# Patient Record
Sex: FEMALE | Race: WHITE | NOT HISPANIC OR LATINO | Employment: FULL TIME | URBAN - METROPOLITAN AREA
[De-identification: names, ages, dates, MRNs, and addresses within clinical notes are randomized per-mention and may not be internally consistent; named-entity substitution may affect disease eponyms.]

---

## 2017-11-30 ENCOUNTER — TELEPHONE (OUTPATIENT)
Dept: ORTHOPEDICS | Facility: CLINIC | Age: 47
End: 2017-11-30

## 2017-11-30 DIAGNOSIS — M79.641 RIGHT HAND PAIN: Primary | ICD-10-CM

## 2017-11-30 NOTE — TELEPHONE ENCOUNTER
Ortho Telephone Triage Call  [phone]  Notified pt that Ortho appt has been scheduled on 12/18/17 with ROCHELLE Bradford PA-C/[facility]  for c/o R thumb trigger finger. Xrays to be scheduled at 10:30am - 1st floor Radiology - followed by appt with ROCHELLE Mascorro PA-C at 11:30am. Pt states understanding. [facility] contact number provided for any questions/concerns in interim. Appt slip mailed.

## 2017-12-15 ENCOUNTER — TELEPHONE (OUTPATIENT)
Dept: ORTHOPEDICS | Facility: CLINIC | Age: 47
End: 2017-12-15

## 2017-12-18 ENCOUNTER — HOSPITAL ENCOUNTER (OUTPATIENT)
Dept: RADIOLOGY | Facility: OTHER | Age: 47
Discharge: HOME | End: 2017-12-18
Attending: ORTHOPAEDIC SURGERY
Payer: COMMERCIAL

## 2017-12-18 ENCOUNTER — OFFICE VISIT (OUTPATIENT)
Dept: ORTHOPEDICS | Facility: CLINIC | Age: 47
End: 2017-12-18
Payer: COMMERCIAL

## 2017-12-18 VITALS
WEIGHT: 150 LBS | HEIGHT: 66 IN | BODY MASS INDEX: 24.11 KG/M2 | RESPIRATION RATE: 18 BRPM | HEART RATE: 71 BPM | DIASTOLIC BLOOD PRESSURE: 82 MMHG | SYSTOLIC BLOOD PRESSURE: 101 MMHG

## 2017-12-18 DIAGNOSIS — M65.311 TRIGGER FINGER OF RIGHT THUMB: Primary | ICD-10-CM

## 2017-12-18 DIAGNOSIS — M79.641 RIGHT HAND PAIN: ICD-10-CM

## 2017-12-18 PROCEDURE — 73130 X-RAY EXAM OF HAND: CPT | Mod: 26,RT,, | Performed by: RADIOLOGY

## 2017-12-18 PROCEDURE — 99999 PR PBB SHADOW E&M-EST. PATIENT-LVL III: CPT | Mod: PBBFAC,,, | Performed by: PHYSICIAN ASSISTANT

## 2017-12-18 PROCEDURE — 73130 X-RAY EXAM OF HAND: CPT | Mod: TC,RT

## 2017-12-18 PROCEDURE — 99204 OFFICE O/P NEW MOD 45 MIN: CPT | Mod: S$GLB,,, | Performed by: PHYSICIAN ASSISTANT

## 2017-12-18 RX ORDER — CETIRIZINE HYDROCHLORIDE 10 MG/1
10 TABLET ORAL DAILY
COMMUNITY

## 2017-12-18 RX ORDER — ASPIRIN 81 MG/1
81 TABLET ORAL NIGHTLY
COMMUNITY
End: 2021-02-08 | Stop reason: CLARIF

## 2017-12-18 RX ORDER — HYDROCHLOROTHIAZIDE 12.5 MG/1
12.5 TABLET ORAL DAILY
COMMUNITY
End: 2021-02-08 | Stop reason: CLARIF

## 2017-12-18 RX ORDER — MAGNESIUM 30 MG
TABLET ORAL NIGHTLY
COMMUNITY

## 2017-12-18 RX ORDER — AMLODIPINE BESYLATE 5 MG/1
5 TABLET ORAL NIGHTLY
COMMUNITY

## 2017-12-18 RX ORDER — MULTIVIT WITH MINERALS/HERBS
1 TABLET ORAL DAILY
COMMUNITY

## 2017-12-18 RX ORDER — POTASSIUM CHLORIDE 750 MG/1
10 TABLET, EXTENDED RELEASE ORAL ONCE
COMMUNITY
End: 2021-02-08 | Stop reason: CLARIF

## 2017-12-18 RX ORDER — IBUPROFEN 100 MG/5ML
1000 SUSPENSION, ORAL (FINAL DOSE FORM) ORAL DAILY
COMMUNITY

## 2017-12-18 RX ORDER — CHOLECALCIFEROL (VITAMIN D3) 25 MCG
1000 TABLET ORAL NIGHTLY
COMMUNITY

## 2017-12-18 NOTE — PROGRESS NOTES
"Subjective:      Patient ID: Nadine To is a 47 y.o. female.    Chief Complaint: Pain of the Right Hand      HPI  Nadine To is a right hand dominant 47 y.o. female presenting today for evaluation of new pain in the right hand.  There was not a history of trauma.  Onset of symptoms began  in July 2017, but got worse in 8/2017.  She had a shot for trigger finger on the [place] at an orthopedic office in 8/2017, but says that she had a bad experience and mild improvement in pain but not totally resolved.  She reports that it now pops if she moves it "more than a certain amount" of flexion.  She reports that it has to be actively released. She describes a throbbing pain 9/10  That does wake her up at night, occasionally occurs at work.  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     She admits to a history of LBBB and says that her last EKG was in early 2017.      Review of patient's allergies indicates:  No Known Allergies      Current Outpatient Prescriptions   Medication Sig Dispense Refill    amLODIPine (NORVASC) 5 MG tablet Take 5 mg by mouth once daily.      ascorbic acid, vitamin C, (VITAMIN C) 1000 MG tablet Take 1,000 mg by mouth once daily.      aspirin (ECOTRIN) 81 MG EC tablet Take 81 mg by mouth once daily.      b complex vitamins tablet Take 1 tablet by mouth once daily.      cetirizine (ZYRTEC) 10 MG tablet Take 10 mg by mouth once daily.      hydroCHLOROthiazide (HYDRODIURIL) 12.5 MG Tab Take 12.5 mg by mouth once daily.      Lactobacillus rhamnosus GG (CULTURELLE) 10 billion cell capsule Take 1 capsule by mouth once daily.      magnesium 30 mg Tab Take by mouth once.      potassium chloride (KLOR-CON) 10 MEQ TbSR Take 10 mEq by mouth once.      vitamin D 1000 units Tab Take 1,000 Units by mouth once daily.       No current facility-administered medications for this visit.        History reviewed. No pertinent past medical " "history.    Past Surgical History:   Procedure Laterality Date    BREAST SURGERY Bilateral          Review of Systems:  Review of Systems   Constitution: Negative for chills and fever.   Skin: Negative for rash and suspicious lesions.   Musculoskeletal:        See HPI   Neurological: Negative for dizziness, headaches, light-headedness, numbness and paresthesias.   Psychiatric/Behavioral: Negative for depression. The patient is not nervous/anxious.          OBJECTIVE:     PHYSICAL EXAM:  Height: 5' 5.5" (166.4 cm) Weight: 68 kg (150 lb)     Vitals:    12/18/17 1112   BP: 101/82   Pulse: 71   Resp: 18     General    Vitals reviewed.  Constitutional: She is oriented to person, place, and time. She appears well-developed and well-nourished.   HENT:   Head: Normocephalic and atraumatic.   Neck: Normal range of motion.   Cardiovascular: Normal rate.    Pulmonary/Chest: Effort normal. No respiratory distress.   Neurological: She is alert and oriented to person, place, and time.   Psychiatric: She has a normal mood and affect. Her behavior is normal. Judgment and thought content normal.           Musculoskeletal: No scars or edema appreciated.  Palpable nodularity at the A1 pulley of both thumbs and the right ring finger. Tender to palpation at the A1 pulley of the right thumb.  Good finger ROM noted. NVI- good sensation and motor function, good capillary refill.      RADIOGRAPHS:  Right Hand X-Ray, 12/18/17  FINDINGS:   Fracture: None.   Joint Spaces: Normal.   Soft Tissues: Normal.   Other: N/A   Impression   No significant abnormality.     Comments: I have personally reviewed the imaging and I agree with the above radiologist's report.    ASSESSMENT/PLAN:   Nadine was seen today for pain.    Diagnoses and all orders for this visit:    Trigger finger of right thumb  -     Vital signs; Standing  -     Diet NPO; Standing  -     Chlorohexidine Gluconate Bath; Standing  -     Place in Outpatient; Standing  -     Patient " may leave on underwear for knee, ankle, and upper extremity surgery; Standing  -     Insert peripheral IV; Standing  -     Place ELIS hose; Standing    Other orders  -     mupirocin 2 % ointment; by Nasal route On call Procedure (surgery).  -     Medium Risk of VTE; Standing  -     ceFAZolin (ANCEF) 2 g in dextrose 5 % 50 mL IVPB; Inject 2 g into the vein On call Procedure (Surgery).           - We talked at length about the anatomy and pathophysiology of   Encounter Diagnosis   Name Primary?    Trigger finger of right thumb Yes       - Discussed treatment options for trigger finger, including injection, splint, and surgery  - Discussed indications and risks of trigger finger release.  Her questions were answered. Consent forms signed today in clinic.  - Call with questions or concerns

## 2017-12-21 RX ORDER — MUPIROCIN 20 MG/G
OINTMENT TOPICAL
Status: CANCELLED | OUTPATIENT
Start: 2017-12-21

## 2017-12-29 ENCOUNTER — HOSPITAL ENCOUNTER (OUTPATIENT)
Dept: PREADMISSION TESTING | Facility: OTHER | Age: 47
Discharge: HOME | End: 2017-12-29
Attending: ORTHOPAEDIC SURGERY
Payer: COMMERCIAL

## 2017-12-29 ENCOUNTER — ANESTHESIA EVENT (OUTPATIENT)
Dept: SURGERY | Facility: OTHER | Age: 47
End: 2017-12-29
Payer: COMMERCIAL

## 2017-12-29 VITALS
HEART RATE: 71 BPM | WEIGHT: 150 LBS | TEMPERATURE: 99 F | SYSTOLIC BLOOD PRESSURE: 114 MMHG | BODY MASS INDEX: 24.99 KG/M2 | DIASTOLIC BLOOD PRESSURE: 69 MMHG | OXYGEN SATURATION: 99 % | HEIGHT: 65 IN

## 2017-12-29 RX ORDER — FAMOTIDINE 20 MG/1
20 TABLET, FILM COATED ORAL
Status: CANCELLED | OUTPATIENT
Start: 2017-12-29 | End: 2017-12-29

## 2017-12-29 RX ORDER — LIDOCAINE HYDROCHLORIDE 10 MG/ML
0.5 INJECTION, SOLUTION EPIDURAL; INFILTRATION; INTRACAUDAL; PERINEURAL ONCE
Status: CANCELLED | OUTPATIENT
Start: 2017-12-29 | End: 2017-12-29

## 2017-12-29 RX ORDER — OXYCODONE HYDROCHLORIDE 5 MG/1
5 TABLET ORAL ONCE AS NEEDED
Status: CANCELLED | OUTPATIENT
Start: 2017-12-29 | End: 2017-12-29

## 2017-12-29 RX ORDER — SODIUM CHLORIDE, SODIUM LACTATE, POTASSIUM CHLORIDE, CALCIUM CHLORIDE 600; 310; 30; 20 MG/100ML; MG/100ML; MG/100ML; MG/100ML
INJECTION, SOLUTION INTRAVENOUS CONTINUOUS
Status: CANCELLED | OUTPATIENT
Start: 2017-12-29

## 2017-12-29 RX ORDER — MIDAZOLAM HYDROCHLORIDE 5 MG/ML
4 INJECTION INTRAMUSCULAR; INTRAVENOUS
Status: CANCELLED | OUTPATIENT
Start: 2017-12-29

## 2017-12-29 NOTE — ANESTHESIA PREPROCEDURE EVALUATION
12/29/2017  Nadine Frances is a 47 y.o., female.    Anesthesia Evaluation    I have reviewed the Patient Summary Reports.    I have reviewed the Nursing Notes.   I have reviewed the Medications.     Review of Systems  Anesthesia Hx:  No problems with previous Anesthesia    Social:  Social Alcohol Use, Non-Smoker    Hematology/Oncology:  Hematology Normal   Oncology Normal     EENT/Dental:EENT/Dental Normal   Cardiovascular:   Exercise tolerance: good Hypertension, well controlled Dysrhythmias    Pulmonary:  Pulmonary Normal    Renal/:  Renal/ Normal     Hepatic/GI:  Hepatic/GI Normal    Musculoskeletal:  Musculoskeletal Normal    Neurological:  Neurology Normal    Endocrine:  Endocrine Normal    Dermatological:  Skin Normal    Psych:  Psychiatric Normal           Physical Exam  General:  Well nourished    Airway/Jaw/Neck:  Airway Findings: Tongue: Normal General Airway Assessment: Good  Mallampati: I  TM Distance: Normal, at least 6 cm  Jaw/Neck Findings:  Neck ROM: Normal ROM      Dental:  Dental Findings: In tact         Mental Status:  Mental Status Findings:  Cooperative, Alert and Oriented         Anesthesia Plan  Type of Anesthesia, risks & benefits discussed:  Anesthesia Type:  general  Patient's Preference:   Intra-op Monitoring Plan: standard ASA monitors  Intra-op Monitoring Plan Comments:   Post Op Pain Control Plan:   Post Op Pain Control Plan Comments:   Induction:    Beta Blocker:         Informed Consent: Patient understands risks and agrees with Anesthesia plan.  Questions answered. Anesthesia consent signed with patient.  ASA Score: 2     Day of Surgery Review of History & Physical:    H&P update referred to the surgeon.     Anesthesia Plan Notes: No labs.        Ready For Surgery From Anesthesia Perspective.

## 2017-12-29 NOTE — DISCHARGE INSTRUCTIONS
PRE-ADMIT TESTING -  [phone]    [address]  [place]          Your surgery has been scheduled at [facility]. We are pleased to have the opportunity to serve you. For Further Information please call [phone].    On the day of surgery please report to the Information Desk on the 1st floor.    · CONTACT YOUR PHYSICIAN'S OFFICE THE DAY PRIOR TO YOUR SURGERY TO OBTAIN YOUR ARRIVAL TIME.     · The evening before surgery do not eat anything after 9 p.m. ( this includes hard candy, chewing gum and mints).  You may only have GATORADE, POWERADE AND WATER  from 9 p.m. until you leave your home.   DO NOT DRINK ANY LIQUIDS ON THE WAY TO THE HOSPITAL.      SPECIAL MEDICATION INSTRUCTIONS: TAKE medications checked off by the Anesthesiologist on your Medication List.    Angiogram Patients: Take medications as instructed by your physician, including aspirin.     Surgery Patients:    If you take ASPIRIN - Your PHYSICIAN/SURGEON will need to inform you IF/OR when you need to stop taking aspirin prior to your surgery.     Do Not take any medications containing IBUPROFEN.  Do Not Wear any make-up or dark nail polish   (especially eye make-up) to surgery. If you come to surgery with makeup on you will be required to remove the makeup or nail polish.    Do not shave your surgical area at least 5 days prior to your surgery. The surgical prep will be performed at the hospital according to Infection Control regulations.    Leave all valuables at home.   Do Not wear any jewelry or watches, including any metal in body piercings.  Contact Lens must be removed before surgery. Either do not wear the contact lens or bring a case and solution for storage.  Please bring a container for eyeglasses or dentures as required.  Bring any paperwork your physician has provided, such as consent forms,  history and physicals, doctor's orders, etc.   Bring comfortable clothes that are loose fitting to wear upon  discharge. Take into consideration the type of surgery being performed.  Maintain your diet as advised per your physician the day prior to surgery.      Adequate rest the night before surgery is advised.   Park in the Parking lot behind the hospital or in the [facility] across the street from the parking lot. Parking is complimentary.  If you will be discharged the same day as your procedure, please arrange for a responsible adult to drive you home or to accompany you if traveling by taxi.   YOU WILL NOT BE PERMITTED TO DRIVE OR TO LEAVE THE HOSPITAL ALONE AFTER SURGERY.   It is strongly recommended that you arrange for someone to remain with you for the first 24 hrs following your surgery.       Thank you for your cooperation.  The Staff of [facility].        Bathing Instructions                                                                 Please shower the evening before and morning of your procedure with    ANTIBACTERIAL SOAP. ( DIAL, etc )  Concentrate on the surgical area   for at least 3 minutes and rinse completely. Dry off as usual.   Do not use any deodorant, powder, body lotions, perfume, after shave or    cologne.

## 2018-01-04 ENCOUNTER — TELEPHONE (OUTPATIENT)
Dept: ORTHOPEDICS | Facility: CLINIC | Age: 48
End: 2018-01-04

## 2018-01-05 ENCOUNTER — SURGERY (OUTPATIENT)
Age: 48
End: 2018-01-05

## 2018-01-05 ENCOUNTER — HOSPITAL ENCOUNTER (OUTPATIENT)
Facility: OTHER | Age: 48
Discharge: HOME | End: 2018-01-05
Attending: ORTHOPAEDIC SURGERY | Admitting: ORTHOPAEDIC SURGERY
Payer: COMMERCIAL

## 2018-01-05 ENCOUNTER — ANESTHESIA (OUTPATIENT)
Dept: SURGERY | Facility: OTHER | Age: 48
End: 2018-01-05
Payer: COMMERCIAL

## 2018-01-05 VITALS
BODY MASS INDEX: 24.99 KG/M2 | HEART RATE: 74 BPM | SYSTOLIC BLOOD PRESSURE: 134 MMHG | RESPIRATION RATE: 18 BRPM | WEIGHT: 150 LBS | OXYGEN SATURATION: 98 % | TEMPERATURE: 98 F | DIASTOLIC BLOOD PRESSURE: 74 MMHG | HEIGHT: 65 IN

## 2018-01-05 DIAGNOSIS — M65.311 TRIGGER FINGER OF RIGHT THUMB: ICD-10-CM

## 2018-01-05 LAB
B-HCG UR QL: NEGATIVE
CTP QC/QA: YES

## 2018-01-05 PROCEDURE — 63600175 PHARM REV CODE 636 W HCPCS: Performed by: SPECIALIST

## 2018-01-05 PROCEDURE — 63600175 PHARM REV CODE 636 W HCPCS: Performed by: NURSE ANESTHETIST, CERTIFIED REGISTERED

## 2018-01-05 PROCEDURE — 71000015 HC POSTOP RECOV 1ST HR: Performed by: ORTHOPAEDIC SURGERY

## 2018-01-05 PROCEDURE — 25000003 PHARM REV CODE 250: Performed by: SPECIALIST

## 2018-01-05 PROCEDURE — 36000707: Performed by: ORTHOPAEDIC SURGERY

## 2018-01-05 PROCEDURE — 63600175 PHARM REV CODE 636 W HCPCS: Performed by: PHYSICIAN ASSISTANT

## 2018-01-05 PROCEDURE — 37000009 HC ANESTHESIA EA ADD 15 MINS: Performed by: ORTHOPAEDIC SURGERY

## 2018-01-05 PROCEDURE — 81025 URINE PREGNANCY TEST: CPT | Performed by: SPECIALIST

## 2018-01-05 PROCEDURE — 36000706: Performed by: ORTHOPAEDIC SURGERY

## 2018-01-05 PROCEDURE — 26055 INCISE FINGER TENDON SHEATH: CPT | Mod: F5,,, | Performed by: ORTHOPAEDIC SURGERY

## 2018-01-05 PROCEDURE — 25000003 PHARM REV CODE 250: Performed by: PHYSICIAN ASSISTANT

## 2018-01-05 PROCEDURE — 25000003 PHARM REV CODE 250: Performed by: ORTHOPAEDIC SURGERY

## 2018-01-05 PROCEDURE — 37000008 HC ANESTHESIA 1ST 15 MINUTES: Performed by: ORTHOPAEDIC SURGERY

## 2018-01-05 RX ORDER — LIDOCAINE HCL/PF 100 MG/5ML
SYRINGE (ML) INTRAVENOUS
Status: DISCONTINUED | OUTPATIENT
Start: 2018-01-05 | End: 2018-01-05

## 2018-01-05 RX ORDER — FENTANYL CITRATE 50 UG/ML
INJECTION, SOLUTION INTRAMUSCULAR; INTRAVENOUS
Status: DISCONTINUED | OUTPATIENT
Start: 2018-01-05 | End: 2018-01-05

## 2018-01-05 RX ORDER — HYDROMORPHONE HYDROCHLORIDE 2 MG/ML
0.4 INJECTION, SOLUTION INTRAMUSCULAR; INTRAVENOUS; SUBCUTANEOUS EVERY 5 MIN PRN
Status: DISCONTINUED | OUTPATIENT
Start: 2018-01-05 | End: 2018-01-05 | Stop reason: HOSPADM

## 2018-01-05 RX ORDER — MUPIROCIN 20 MG/G
OINTMENT TOPICAL
Status: DISCONTINUED | OUTPATIENT
Start: 2018-01-05 | End: 2018-01-05 | Stop reason: HOSPADM

## 2018-01-05 RX ORDER — DEXAMETHASONE SODIUM PHOSPHATE 4 MG/ML
INJECTION, SOLUTION INTRA-ARTICULAR; INTRALESIONAL; INTRAMUSCULAR; INTRAVENOUS; SOFT TISSUE
Status: DISCONTINUED | OUTPATIENT
Start: 2018-01-05 | End: 2018-01-05

## 2018-01-05 RX ORDER — DIPHENHYDRAMINE HYDROCHLORIDE 50 MG/ML
25 INJECTION INTRAMUSCULAR; INTRAVENOUS EVERY 6 HOURS PRN
Status: DISCONTINUED | OUTPATIENT
Start: 2018-01-05 | End: 2018-01-05 | Stop reason: HOSPADM

## 2018-01-05 RX ORDER — SODIUM CHLORIDE 0.9 % (FLUSH) 0.9 %
3 SYRINGE (ML) INJECTION
Status: DISCONTINUED | OUTPATIENT
Start: 2018-01-05 | End: 2018-01-05 | Stop reason: HOSPADM

## 2018-01-05 RX ORDER — ONDANSETRON 2 MG/ML
4 INJECTION INTRAMUSCULAR; INTRAVENOUS DAILY PRN
Status: DISCONTINUED | OUTPATIENT
Start: 2018-01-05 | End: 2018-01-05 | Stop reason: HOSPADM

## 2018-01-05 RX ORDER — PROPOFOL 10 MG/ML
VIAL (ML) INTRAVENOUS CONTINUOUS PRN
Status: DISCONTINUED | OUTPATIENT
Start: 2018-01-05 | End: 2018-01-05

## 2018-01-05 RX ORDER — BUPIVACAINE HYDROCHLORIDE 2.5 MG/ML
INJECTION, SOLUTION EPIDURAL; INFILTRATION; INTRACAUDAL
Status: DISCONTINUED | OUTPATIENT
Start: 2018-01-05 | End: 2018-01-05 | Stop reason: HOSPADM

## 2018-01-05 RX ORDER — FENTANYL CITRATE 50 UG/ML
25 INJECTION, SOLUTION INTRAMUSCULAR; INTRAVENOUS EVERY 5 MIN PRN
Status: DISCONTINUED | OUTPATIENT
Start: 2018-01-05 | End: 2018-01-05 | Stop reason: HOSPADM

## 2018-01-05 RX ORDER — ONDANSETRON 2 MG/ML
INJECTION INTRAMUSCULAR; INTRAVENOUS
Status: DISCONTINUED | OUTPATIENT
Start: 2018-01-05 | End: 2018-01-05

## 2018-01-05 RX ORDER — FAMOTIDINE 20 MG/1
20 TABLET, FILM COATED ORAL
Status: COMPLETED | OUTPATIENT
Start: 2018-01-05 | End: 2018-01-05

## 2018-01-05 RX ORDER — MEPERIDINE HYDROCHLORIDE 50 MG/ML
12.5 INJECTION INTRAMUSCULAR; INTRAVENOUS; SUBCUTANEOUS ONCE AS NEEDED
Status: DISCONTINUED | OUTPATIENT
Start: 2018-01-05 | End: 2018-01-05 | Stop reason: HOSPADM

## 2018-01-05 RX ORDER — LIDOCAINE HYDROCHLORIDE 10 MG/ML
0.5 INJECTION, SOLUTION EPIDURAL; INFILTRATION; INTRACAUDAL; PERINEURAL ONCE
Status: DISCONTINUED | OUTPATIENT
Start: 2018-01-05 | End: 2018-01-05 | Stop reason: HOSPADM

## 2018-01-05 RX ORDER — ACETAMINOPHEN 10 MG/ML
INJECTION, SOLUTION INTRAVENOUS
Status: DISCONTINUED | OUTPATIENT
Start: 2018-01-05 | End: 2018-01-05

## 2018-01-05 RX ORDER — CEFAZOLIN SODIUM 2 G/50ML
2 SOLUTION INTRAVENOUS
Status: COMPLETED | OUTPATIENT
Start: 2018-01-05 | End: 2018-01-05

## 2018-01-05 RX ORDER — MIDAZOLAM HYDROCHLORIDE 5 MG/ML
4 INJECTION INTRAMUSCULAR; INTRAVENOUS
Status: DISCONTINUED | OUTPATIENT
Start: 2018-01-05 | End: 2018-01-05 | Stop reason: HOSPADM

## 2018-01-05 RX ORDER — LIDOCAINE HYDROCHLORIDE 10 MG/ML
INJECTION, SOLUTION EPIDURAL; INFILTRATION; INTRACAUDAL; PERINEURAL
Status: DISCONTINUED | OUTPATIENT
Start: 2018-01-05 | End: 2018-01-05 | Stop reason: HOSPADM

## 2018-01-05 RX ORDER — ACETAMINOPHEN AND CODEINE PHOSPHATE 300; 30 MG/1; MG/1
1 TABLET ORAL
Qty: 20 TABLET | Refills: 0 | Status: SHIPPED | OUTPATIENT
Start: 2018-01-05 | End: 2018-01-19

## 2018-01-05 RX ORDER — SODIUM CHLORIDE, SODIUM LACTATE, POTASSIUM CHLORIDE, CALCIUM CHLORIDE 600; 310; 30; 20 MG/100ML; MG/100ML; MG/100ML; MG/100ML
INJECTION, SOLUTION INTRAVENOUS CONTINUOUS
Status: DISCONTINUED | OUTPATIENT
Start: 2018-01-05 | End: 2018-01-05 | Stop reason: HOSPADM

## 2018-01-05 RX ORDER — ONDANSETRON 4 MG/1
4 TABLET, FILM COATED ORAL EVERY 8 HOURS PRN
Qty: 21 TABLET | Refills: 0 | Status: SHIPPED | OUTPATIENT
Start: 2018-01-05 | End: 2018-01-19

## 2018-01-05 RX ORDER — OXYCODONE HYDROCHLORIDE 5 MG/1
5 TABLET ORAL
Status: DISCONTINUED | OUTPATIENT
Start: 2018-01-05 | End: 2018-01-05 | Stop reason: HOSPADM

## 2018-01-05 RX ORDER — OXYCODONE HYDROCHLORIDE 5 MG/1
5 TABLET ORAL ONCE AS NEEDED
Status: DISCONTINUED | OUTPATIENT
Start: 2018-01-05 | End: 2018-01-05 | Stop reason: HOSPADM

## 2018-01-05 RX ADMIN — MIDAZOLAM HYDROCHLORIDE 4 MG: 5 INJECTION, SOLUTION INTRAMUSCULAR; INTRAVENOUS at 06:01

## 2018-01-05 RX ADMIN — LIDOCAINE HYDROCHLORIDE 10 ML: 10 INJECTION, SOLUTION EPIDURAL; INFILTRATION; INTRACAUDAL; PERINEURAL at 07:01

## 2018-01-05 RX ADMIN — LIDOCAINE HYDROCHLORIDE 100 MG: 20 INJECTION, SOLUTION INTRAVENOUS at 07:01

## 2018-01-05 RX ADMIN — PROPOFOL 100 MCG/KG/MIN: 10 INJECTION, EMULSION INTRAVENOUS at 07:01

## 2018-01-05 RX ADMIN — ONDANSETRON 4 MG: 2 INJECTION INTRAMUSCULAR; INTRAVENOUS at 07:01

## 2018-01-05 RX ADMIN — MUPIROCIN: 20 OINTMENT TOPICAL at 06:01

## 2018-01-05 RX ADMIN — BUPIVACAINE HYDROCHLORIDE 10 ML: 2.5 INJECTION, SOLUTION EPIDURAL; INFILTRATION; INTRACAUDAL; PERINEURAL at 07:01

## 2018-01-05 RX ADMIN — ACETAMINOPHEN 1000 MG: 10 INJECTION, SOLUTION INTRAVENOUS at 07:01

## 2018-01-05 RX ADMIN — SODIUM CHLORIDE, SODIUM LACTATE, POTASSIUM CHLORIDE, AND CALCIUM CHLORIDE: 600; 310; 30; 20 INJECTION, SOLUTION INTRAVENOUS at 06:01

## 2018-01-05 RX ADMIN — FENTANYL CITRATE 100 MCG: 50 INJECTION, SOLUTION INTRAMUSCULAR; INTRAVENOUS at 07:01

## 2018-01-05 RX ADMIN — DEXAMETHASONE SODIUM PHOSPHATE 8 MG: 4 INJECTION, SOLUTION INTRAMUSCULAR; INTRAVENOUS at 07:01

## 2018-01-05 RX ADMIN — CEFAZOLIN SODIUM 2 G: 2 SOLUTION INTRAVENOUS at 07:01

## 2018-01-05 RX ADMIN — FAMOTIDINE 20 MG: 20 TABLET, FILM COATED ORAL at 06:01

## 2018-01-05 NOTE — DISCHARGE INSTRUCTIONS
Anesthesia: Monitored Anesthesia Care (MAC)    Youre due to have surgery. During surgery, youll be given medicine called anesthesia. This will keep you comfortable and pain-free. Your surgeon will use monitored anesthesia care (MAC). This sheet tells you more about this type of anesthesia.  What is monitored anesthesia care?  MAC keeps you very drowsy during surgery. You may be awake, but you will likely not remember much. And you wont feel pain. With MAC, medicines are given through an IV line into a vein in your arm or hand. A local anesthetic will usually be injected into the skin and muscle around the surgical site to numb it. The anesthesia provider monitors you during the procedure. He or she checks your heart rate and rhythm, blood pressure, and blood oxygen level.  Anesthesia tools and medicines that may be near you during your procedure  You will likely have:  · A pulse oximeter on the end of your finger. This measures your blood oxygen level.  · Electrocardiography leads (electrodes) on your chest. These record your heart rate and rhythm.  · Medicines given through an IV. These relax you and prevent pain. You may be awake or sleep lightly. If you have local anesthetic, it is injected directly into your skin.  · A facemask to give you oxygen, if needed.  Risks and possible complications  MAC has some risks. These include:  · Breathing problems  · Nausea and vomiting  · Allergic reaction to the anesthetic    Anesthesia safety  Tips for anesthesia safety include the following:   · Follow all instructions you are given for how long not to eat or drink before your procedure.  · Be sure your healthcare provider knows what medicines you take, especially any anti-inflammatory medicine or blood thinners. This includes aspirin and any other over-the-counter medicines, herbs, and supplements.  · Have an adult family member or friend drive you home after the procedure.  · For the first 24 hours after your  surgery:  ¨ Do not drive or use heavy equipment.  ¨ Do not make important decisions or sign documents.  ¨ Avoid alcohol.  ¨ Have someone stay with you, if possible. They can watch for problems and help keep you safe.      Follow Trigger finger release discharge instructions given per .  Date Last Reviewed: 12/1/2016  © 1134-5851 [employer]. [address] [place] [address], [place] [address]. All rights reserved. This information is not intended as a substitute for professional medical care. Always follow your healthcare professional's instructions.

## 2018-01-05 NOTE — ANESTHESIA POSTPROCEDURE EVALUATION
"Anesthesia Post Evaluation    Patient: Nadine Frances    Procedure(s) Performed: Procedure(s) (LRB):  RELEASE-FINGER-TRIGGER right thumb (Right)    Final Anesthesia Type: general  Patient location during evaluation: OPS  Patient participation: Yes- Able to Participate  Level of consciousness: awake and alert and oriented  Post-procedure vital signs: reviewed and stable  Pain management: adequate  Airway patency: patent  PONV status at discharge: No PONV  Anesthetic complications: no      Cardiovascular status: stable  Respiratory status: unassisted, spontaneous ventilation and room air  Hydration status: euvolemic  Follow-up not needed.        Visit Vitals  BP (!) 136/92 (BP Location: Left arm, Patient Position: Lying)   Pulse 70   Temp 36.8 °C (98.2 °F) (Oral)   Resp 18   Ht 5' 5" (1.651 m)   Wt 68 kg (150 lb)   LMP 09/05/2017 (Within Months)   SpO2 98%   Breastfeeding? No   BMI 24.96 kg/m²       Pain/Jana Score: Pain Assessment Performed: Yes (1/5/2018  6:20 AM)  Presence of Pain: denies (1/5/2018  6:20 AM)      "

## 2018-01-05 NOTE — PLAN OF CARE
Nadine Frances has met all discharge criteria from Phase II. Vital Signs are stable, ambulating  without difficulty. Discharge instructions given, patient verbalized understanding. Discharged from facility via wheelchair in stable condition.

## 2018-01-05 NOTE — OP NOTE
DATE OF PROCEDURE: 1/5/18  SERVICE: Orthopedics.   ATTENDING SURGEON: Valentine Roberts M.D.   ASSISTANT SURGEON: none   PREOPERATIVE DIAGNOSIS:right thumb trigger finger.   POSTOPERATIVE DIAGNOSIS: l  right  Thumb finger trigger finger.   PROCEDURE: right thumb  finger A1 pulley release.   ANESTHESIA: Local placed by surgeon and MAC.   FLUID: Lactated Ringer's.   BLOOD LOSS: None, no blood was given.   TOURNIQUET TIME: 10 minutes.   PACKS AND DRAINS: None.   IMPLANTS: None.   SPECIMENS: None.   COMPLICATIONS: None.   INDICATIONS FOR PROCEDURE: Nadine Frances is a 47 y.o.year-old who has failed   conservative treatment for  right  thumbfinger trigger finger; therefore,   operative intervention was deemed necessary. Risks and benefits were explained   to the patient in clinic. Consents were performed in clinic.   PROCEDURE IN DETAIL: After the correct site was marked with the patient's   participation in the Holding Area, the patient was brought to the Operating   Room, placed in supine position, underwent MAC anesthesia. A well-padded   nonsterile tourniquet was placed on the right forearm. Time-out was called for   correct site, procedure and patient to be indicated. Under sterile conditions,   an injection of lidocaine 1% without epinephrine was injected at the A1 pulley   space. The arm was prepped and draped in normal sterile fashion. Incision was   marked out in a longitudinal fashion along the pulley. The arm was   exsanguinated using Esmarch. Tourniquet was insufflated 250 mmHg and that is   where it remained for 10 minutes. The incision was made. Careful dissection   down was maintained to the A1 pulley. The neurovascular structures were   retracted out of the way. The A1 pulley was identified. It was sharply   incised. It was completely incised proximally and distally. Portion of    pulley was also incised. The tendon was then retracted out of the tendon sheath   using a right angle. The finger  was placed through range of motion, showed it   did not trigger. The area was irrigated with copious amounts of normal saline.   Nylon closed the skin. Sterile dressing was applied. Tourniquet was deflated.   Brisk cap refill ensued. The patient was transferred the Recovery Room in   stable condition.   POSTOPERATIVE PLAN FOR THIS PATIENT: The patient is to keep the dressing clean, dry and   intact. We will take the sutures out at two weeks.

## 2018-01-05 NOTE — BRIEF OP NOTE
Brief Operative Note     SUMMARY     Surgery Date: 1/5/2018     Surgeon(s) and Role:     * Valentine Roberts MD - Primary    Assisting Surgeon: None    Pre-op Diagnosis:  Trigger finger of right thumb [M65.311]    Post-op Diagnosis:  Trigger finger of right thumb [M65.311]    Procedure(s) (LRB):  RELEASE-FINGER-TRIGGER right thumb (Right)    Anesthesia: Local MAC    Description of Procedure:   Right Thumb A1 pulley release    Findings/Key Components:  Right Thumb A1 pulley release    Estimated Blood Loss: Minimal         Specimens Removed:   Specimen (12h ago through future)    None          Discharge Note      SUMMARY     Admit Date: 1/5/2018    Attending Physician: Valentine Roberts MD     Discharge Physician: Valentine Roberts MD    Discharge Date: 1/5/2018     Final Diagnosis: Trigger finger of right thumb [M65.311]    Hospital Course: Patient was admitted for an outpatient procedure and tolerated the procedure well with no complications.    Disposition: Home or Self Care    Follow Up/Patient Instructions:   Current Discharge Medication List      CONTINUE these medications which have NOT CHANGED    Details   amLODIPine (NORVASC) 5 MG tablet Take 5 mg by mouth every evening.       ascorbic acid, vitamin C, (VITAMIN C) 1000 MG tablet Take 1,000 mg by mouth once daily.      aspirin (ECOTRIN) 81 MG EC tablet Take 81 mg by mouth every evening.       b complex vitamins tablet Take 1 tablet by mouth once daily.      cetirizine (ZYRTEC) 10 MG tablet Take 10 mg by mouth once daily.      hydroCHLOROthiazide (HYDRODIURIL) 12.5 MG Tab Take 12.5 mg by mouth once daily.      Lactobacillus rhamnosus GG (CULTURELLE) 10 billion cell capsule Take 1 capsule by mouth once daily.      magnesium 30 mg Tab Take by mouth every evening.       potassium chloride (KLOR-CON) 10 MEQ TbSR Take 10 mEq by mouth once.      UNABLE TO FIND Lo   estragen birth control      vitamin D 1000 units Tab Take 1,000 Units by mouth every  evening.       acetaminophen-codeine 300-30mg (TYLENOL-CODEINE #3) 300-30 mg Tab Take 1 tablet by mouth every 4 to 6 hours as needed (moderate to severe pain).  Qty: 20 tablet, Refills: 0      docusate sodium (COLACE) 50 MG capsule Take 1 capsule (50 mg total) by mouth 2 (two) times daily.  Qty: 30 capsule, Refills: 0      ondansetron (ZOFRAN, AS HYDROCHLORIDE,) 4 MG tablet Take 1 tablet (4 mg total) by mouth every 8 (eight) hours as needed for Nausea.  Qty: 21 tablet, Refills: 0           Follow-up Information     [facility] In 2 weeks.    Specialties:  Hand Surgery, Orthopedic Surgery  Why:  For suture removal, For wound re-check  Contact information:  [address] [address]  [phone]                 Discharge Procedure Orders (must include Diet, Follow-up, Activity)    Discharge Procedure Orders (must include Diet, Follow-up, Activity)  Activity as tolerated     Lifting restrictions     Keep surgical extremity elevated     Notify your health care provider if you experience any of the following:  temperature >100.4     Notify your health care provider if you experience any of the following:  persistent nausea and vomiting or diarrhea     Notify your health care provider if you experience any of the following:  severe uncontrolled pain     Notify your health care provider if you experience any of the following:  redness, tenderness, or signs of infection (pain, swelling, redness, odor or green/yellow discharge around incision site)     Notify your health care provider if you experience any of the following:  worsening rash     Leave dressing on - Keep it clean, dry, and intact until clinic visit

## 2018-01-05 NOTE — H&P (VIEW-ONLY)
"Subjective:      Patient ID: Nadine To is a 47 y.o. female.    Chief Complaint: Pain of the Right Hand      HPI  Nadine To is a right hand dominant 47 y.o. female presenting today for evaluation of new pain in the right hand.  There was not a history of trauma.  Onset of symptoms began  in July 2017, but got worse in 8/2017.  She had a shot for trigger finger on the [place] at an orthopedic office in 8/2017, but says that she had a bad experience and mild improvement in pain but not totally resolved.  She reports that it now pops if she moves it "more than a certain amount" of flexion.  She reports that it has to be actively released. She describes a throbbing pain 9/10  That does wake her up at night, occasionally occurs at work.  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     She admits to a history of LBBB and says that her last EKG was in early 2017.      Review of patient's allergies indicates:  No Known Allergies      Current Outpatient Prescriptions   Medication Sig Dispense Refill    amLODIPine (NORVASC) 5 MG tablet Take 5 mg by mouth once daily.      ascorbic acid, vitamin C, (VITAMIN C) 1000 MG tablet Take 1,000 mg by mouth once daily.      aspirin (ECOTRIN) 81 MG EC tablet Take 81 mg by mouth once daily.      b complex vitamins tablet Take 1 tablet by mouth once daily.      cetirizine (ZYRTEC) 10 MG tablet Take 10 mg by mouth once daily.      hydroCHLOROthiazide (HYDRODIURIL) 12.5 MG Tab Take 12.5 mg by mouth once daily.      Lactobacillus rhamnosus GG (CULTURELLE) 10 billion cell capsule Take 1 capsule by mouth once daily.      magnesium 30 mg Tab Take by mouth once.      potassium chloride (KLOR-CON) 10 MEQ TbSR Take 10 mEq by mouth once.      vitamin D 1000 units Tab Take 1,000 Units by mouth once daily.       No current facility-administered medications for this visit.        History reviewed. No pertinent past medical " "history.    Past Surgical History:   Procedure Laterality Date    BREAST SURGERY Bilateral          Review of Systems:  Review of Systems   Constitution: Negative for chills and fever.   Skin: Negative for rash and suspicious lesions.   Musculoskeletal:        See HPI   Neurological: Negative for dizziness, headaches, light-headedness, numbness and paresthesias.   Psychiatric/Behavioral: Negative for depression. The patient is not nervous/anxious.          OBJECTIVE:     PHYSICAL EXAM:  Height: 5' 5.5" (166.4 cm) Weight: 68 kg (150 lb)     Vitals:    12/18/17 1112   BP: 101/82   Pulse: 71   Resp: 18     General    Vitals reviewed.  Constitutional: She is oriented to person, place, and time. She appears well-developed and well-nourished.   HENT:   Head: Normocephalic and atraumatic.   Neck: Normal range of motion.   Cardiovascular: Normal rate.    Pulmonary/Chest: Effort normal. No respiratory distress.   Neurological: She is alert and oriented to person, place, and time.   Psychiatric: She has a normal mood and affect. Her behavior is normal. Judgment and thought content normal.           Musculoskeletal: No scars or edema appreciated.  Palpable nodularity at the A1 pulley of both thumbs and the right ring finger. Tender to palpation at the A1 pulley of the right thumb.  Good finger ROM noted. NVI- good sensation and motor function, good capillary refill.      RADIOGRAPHS:  Right Hand X-Ray, 12/18/17  FINDINGS:   Fracture: None.   Joint Spaces: Normal.   Soft Tissues: Normal.   Other: N/A   Impression   No significant abnormality.     Comments: I have personally reviewed the imaging and I agree with the above radiologist's report.    ASSESSMENT/PLAN:   Nadine was seen today for pain.    Diagnoses and all orders for this visit:    Trigger finger of right thumb  -     Vital signs; Standing  -     Diet NPO; Standing  -     Chlorohexidine Gluconate Bath; Standing  -     Place in Outpatient; Standing  -     Patient " may leave on underwear for knee, ankle, and upper extremity surgery; Standing  -     Insert peripheral IV; Standing  -     Place ELIS hose; Standing    Other orders  -     mupirocin 2 % ointment; by Nasal route On call Procedure (surgery).  -     Medium Risk of VTE; Standing  -     ceFAZolin (ANCEF) 2 g in dextrose 5 % 50 mL IVPB; Inject 2 g into the vein On call Procedure (Surgery).           - We talked at length about the anatomy and pathophysiology of   Encounter Diagnosis   Name Primary?    Trigger finger of right thumb Yes       - Discussed treatment options for trigger finger, including injection, splint, and surgery  - Discussed indications and risks of trigger finger release.  Her questions were answered. Consent forms signed today in clinic.  - Call with questions or concerns

## 2018-01-05 NOTE — BRIEF OP NOTE
Preoperative Diagnosis: right trigger thumb  Postoperative Diagnosis:same  Procedure:right thumb A1 pulley release  Blood loss:none  Implants:none  Specimen:none  Complications:none  Surgeon:Dawood Berry Surgeon:

## 2018-01-05 NOTE — INTERVAL H&P NOTE
The patient has been examined and the H&P has been reviewed:    I concur with the findings and no changes have occurred since H&P was written. reviewed risks and benefits again with pt and pt has no further questions    Anesthesia/Surgery risks, benefits and alternative options discussed and understood by patient/family.          Active Hospital Problems    Diagnosis  POA    Trigger finger of right thumb [M65.311]  Yes      Resolved Hospital Problems    Diagnosis Date Resolved POA   No resolved problems to display.

## 2018-01-19 ENCOUNTER — OFFICE VISIT (OUTPATIENT)
Dept: ORTHOPEDICS | Facility: CLINIC | Age: 48
End: 2018-01-19
Payer: COMMERCIAL

## 2018-01-19 VITALS
BODY MASS INDEX: 24.99 KG/M2 | WEIGHT: 150 LBS | DIASTOLIC BLOOD PRESSURE: 80 MMHG | SYSTOLIC BLOOD PRESSURE: 130 MMHG | HEIGHT: 65 IN | HEART RATE: 81 BPM

## 2018-01-19 DIAGNOSIS — Z98.890 S/P TRIGGER FINGER RELEASE: Primary | ICD-10-CM

## 2018-01-19 DIAGNOSIS — Z98.890 POST-OPERATIVE STATE: ICD-10-CM

## 2018-01-19 PROCEDURE — 99024 POSTOP FOLLOW-UP VISIT: CPT | Mod: S$GLB,,, | Performed by: PHYSICIAN ASSISTANT

## 2018-01-19 PROCEDURE — 99999 PR PBB SHADOW E&M-EST. PATIENT-LVL III: CPT | Mod: PBBFAC,,, | Performed by: PHYSICIAN ASSISTANT

## 2018-01-19 NOTE — PROGRESS NOTES
"Ms. Frances is here today for a post-operative visit.  She is 14 days status post right thumb A1 pulley release by Dr. Roberts on 1/5/18. She reports that she is doing well.  Pain is 0/10.  She is not taking pain medication.  She denies fever, chills, and sweats since the time of the surgery.     Physical exam:    Vitals:    01/19/18 0955   BP: 130/80   Pulse: 81   Weight: 68 kg (150 lb)   Height: 5' 5" (1.651 m)   PainSc: 0-No pain   PainLoc: Finger     Vital signs are stable, patient is afebrile.  Patient is well dressed and well groomed, no acute distress.  Alert and oriented to person, place, and time.  Post op dressing taken down.  Incision is clean, dry and intact.  There is no erythema or exudate.  There is no sign of any infection. She is NVI. Sutures removed without difficulty. Good ROM of the wrist with some stiffness of the thumb and pain with full flexion.    Assessment:  14 days status post right thumb A1 pulley release    Plan:  Nadine was seen today for pain.    Diagnoses and all orders for this visit:    S/P trigger finger release  -     Ambulatory Referral to Physical/Occupational Therapy    Post-operative state  -     Ambulatory Referral to Physical/Occupational Therapy      - PO instruction reviewed and provided to patient  -OT ordered due to thumb stiffness  -can likely return to work in 2 wks ()  -RTC 4 wks if needed      Emeli Lui PA-C  [facility]   [facility] [place], [place]        "

## 2018-01-25 ENCOUNTER — CLINICAL SUPPORT (OUTPATIENT)
Dept: REHABILITATION | Facility: HOSPITAL | Age: 48
End: 2018-01-25
Payer: COMMERCIAL

## 2018-01-25 DIAGNOSIS — R29.898 THUMB WEAKNESS: ICD-10-CM

## 2018-01-25 DIAGNOSIS — M65.311 TRIGGER FINGER OF RIGHT THUMB: Primary | ICD-10-CM

## 2018-01-25 DIAGNOSIS — M25.60 RANGE OF MOTION DEFICIT: ICD-10-CM

## 2018-01-25 PROBLEM — M79.644 THUMB PAIN, RIGHT: Status: ACTIVE | Noted: 2018-01-25

## 2018-01-25 PROCEDURE — 97165 OT EVAL LOW COMPLEX 30 MIN: CPT | Mod: PN

## 2018-01-25 NOTE — PLAN OF CARE
"Occupational Therapy Evaluation    Patient: Nadine Frances  Date of Evaluation: 01/25/2018  Referring Physician: Dr. Roberts  Diagnosis:   Encounter Diagnoses   Name Primary?    Range of motion deficit     Trigger finger of right thumb Yes    Thumb weakness         Referral Orders: Eval and treat    Start Time: 1:35 pm  End Time: 2:30 pm  Total Time: 55 min  Group Time: -    Age: 47 y.o.  Sex: female  Hand dominance: right    Occupation:   Working presently: No  Last time worked: 12/22/17  Workmen's Compensation: No    Date of Injury: July 2017  Date of Surgery: 1/5/18  Involved areas: right thumb    Mechanism of Injury: Insidious onset of triggering occurred after increase in working behind the chair.  Past Medical History:   Diagnosis Date    Cancer     basal cell     Hypertension     LBBB (left bundle branch block)      Current Outpatient Prescriptions   Medication Sig    amLODIPine (NORVASC) 5 MG tablet Take 5 mg by mouth every evening.     ascorbic acid, vitamin C, (VITAMIN C) 1000 MG tablet Take 1,000 mg by mouth once daily.    aspirin (ECOTRIN) 81 MG EC tablet Take 81 mg by mouth every evening.     b complex vitamins tablet Take 1 tablet by mouth once daily.    cetirizine (ZYRTEC) 10 MG tablet Take 10 mg by mouth once daily.    hydroCHLOROthiazide (HYDRODIURIL) 12.5 MG Tab Take 12.5 mg by mouth once daily.    Lactobacillus rhamnosus GG (CULTURELLE) 10 billion cell capsule Take 1 capsule by mouth once daily.    magnesium 30 mg Tab Take by mouth every evening.     potassium chloride (KLOR-CON) 10 MEQ TbSR Take 10 mEq by mouth once.    vitamin D 1000 units Tab Take 1,000 Units by mouth every evening.      No current facility-administered medications for this visit.      Review of patient's allergies indicates:  No Known Allergies  X-Ray Results: n/a  MRI Results: n/a  EMG Results: n/a    Subjective  Nadine reports "I have had it covered because it started to bleed after " "the stitches were out."    Functional Pain Scale Rating 0-10: 0/10  Location: n/a  Description: n/a  Activities which increase pain: n/a  Activities which decrease pain: n/a    Nadine's goals for therapy are: To be able to get back to my normal routine of exercise and work    Objective    Observation: slight swelling of thumb noted, wound healing  Sensation: grossly intact  [place] Rachel Monofilament Test: No  Stereognosis: Intact    Special Tests: n/t    Wound Assessment: no drainage  Color: black scab and dry skin  Size: 1cm  Location: thumb at MP crease    Edema: Circumferential measurements: as follows:    Thumb right Thumb left   Proximal Plalnx 6.3 cm 6.1 cm   DIP Joint 6.4 cm 6.2 cm   Distal Phalnx 5.5 cm 5.3 cm     No swelling noted in hand or wrist    Range of Motion: right fingers active WFL    Thumb  Left                   Right   MP flex 74  IP flex 7     MP flex 50 IP flex  45       Thumb Opposition: to all tips and 5th MCP head  Palmar Abduction: WFL  Radial Abduction: WFL    Wrist Ext/Flex: WFL/WFL  Wrist RD/UD: WFL/WFL  Supination/Pronation: WFL/WFL    Manual Muscle Test:   Grossly 5/5     Strength: (BOBBI Dynamometer in lbs.) Average 3 trials, Position II  Right: 55, 45, 40  avg 46#  Left: 65, 55,55 avg 58#    Pinch Strength: (Pinch Gauge in psi's), Average 3 trials  Mendez Pinch R) 11,11,11, avg 11 psi's   L) 12,10,12 avg 11.3psi's  3pt Pinch   R) 13,12,12  avg 12.3psi's  L) 17,16,15 avg 16 psi's    Fine Jayce Coordination Tests: no difficulty with buttons, snaps, tying all good    Cultural/Spiritual/Education Needs: none noted or reported  Barriers to Learning: none noted or reported    Functional Limitations: Patient presents with the following functional Limitations:   Self Care / ADL: No difficulty with care now    Work/Activities: typing, texting good,  Hand writing is not the same yet, difficulty with opening water bottles or medicine bottles,  does the housework normally.  Able " to fold clothes and make beds. Has not lifted anything heavy.    Leisure: exercise, swimming and bike riding.    Treatment included: OT evaluation and instruction in written HEP including (Hand Therapy/Finger Exercises) Blocking Exercises for thumb IP and MP, Thumb Palmar AB, Thumb Radial AB and Opposition to small thumb slide    Repetitions 10 each   Frequency 5* per day    Pt demonstrated exercises in clinic properly.  Education re: her recovery provided as well.    Assessment  Treatment Diagnosis/ Problem List RUE Decreased ROM, Decreased  strength, Decreased pinch strength, Decreased functional hand use and slight Edema     Goals to be met in 8 weeks:   Patient to be IND with HEP and modalities for pain/edema managment.   Increase ROM 3-5 degrees to increase functional hand use for ADLs/work/leisure activities.   Increase  strength 15 lbs. to improve functional grasp for ADLs/work/leisure activities.   Increase pinch 3 psi's to increase IND opening bottles    Profile and History Assessment of Occupational Performance Level of Clinical Decision Making Complexity Score   Occupational Profile:   Nadine Frances is a 47 y.o. female who lives with their family and is currently self-employed as consultant, cosmotologist. Nadine Frances has difficulty with  carrying, writing and opening bottles  .  affecting his/her daily functional abilities. His/her main goal for therapy is get back to my normal exercise and work.     Comorbidities:   HTN    Medical and Therapy History Review:   Brief               Performance Deficits    Physical:  Joint Mobility  Edema   Strength  Pinch Strength    Cognitive:  No Deficits    Psychosocial:    No Deficits     Clinical Decision Making:  low    Assessment Process:  Problem-Focused Assessments    Modification/Need for Assistance:  Not Necessary    Intervention Selection:  Several Treatment Options       low  Based on PMHX, co morbidities , data from  assessments and functional level of assistance required with task and clinical presentation directly impacting function.       Plan  Nadine Frances to be treated by Occupational Therapy 1-2 times per week for 8 weeks during the certification period from 1/26/18 to 3/22/18 to achieve the established goals.     Treatment to include: Paraffin, Fluidotherapy, Manual therapy/joint mobilizations, Therapeutic exercises/activities., Strengthening, Edema Control and Scar Management, as well as any other treatments deemed necessary based on the patient's needs or progress.     I certify the need for these services furnished under this plan of treatment and while under my care.     ____________________________________                         __________________  Physician/Referring Practitioner                                               Date of Signature

## 2018-01-25 NOTE — PATIENT INSTRUCTIONS
IP Flexion (Active Blocked)        Brace thumb below tip joint. Bend joint as far as possible.  Repeat _10___ times. Do __5__ sessions per day.    Copyright © [facility]. All rights reserved.   MP Flexion (Active Blocked)        Using other hand to brace base of thumb, bend as far as possible with tip joint held straight.  Repeat _10___ times. Do __5__ sessions per day.    Copyright © [facility]. All rights reserved.   Composite Flexion (Active)        Bend both joints of thumb as far as possible. Try to touch base of little finger.  Repeat _10___ times. Do __5__ sessions per day.    Copyright © I. All rights reserved.   Palmar Adduction/Abduction (Active)        Move thumb down, away from palm. Move back to rest along palm.  Repeat _10___ times. Do _5___ sessions per day.    Copyright © I. All rights reserved.   Radial Adduction/Abduction (Active)        Move thumb out to side. Move back alongside index finger.  Repeat _10___ times. Do __5__ sessions per day.    Copyright © I. All rights reserved.   Opposition (Active)        Touch tip of thumb to the tip of small finger slide down small finger to palm and return to tip.  Repeat _10___ times. Do __5__ sessions per day.    Copyright © I. All rights reserved.

## 2018-01-30 ENCOUNTER — CLINICAL SUPPORT (OUTPATIENT)
Dept: REHABILITATION | Facility: HOSPITAL | Age: 48
End: 2018-01-30
Payer: COMMERCIAL

## 2018-01-30 DIAGNOSIS — M65.311 TRIGGER FINGER OF RIGHT THUMB: ICD-10-CM

## 2018-01-30 DIAGNOSIS — M25.60 RANGE OF MOTION DEFICIT: Primary | ICD-10-CM

## 2018-01-30 DIAGNOSIS — R29.898 THUMB WEAKNESS: ICD-10-CM

## 2018-01-30 PROCEDURE — 97140 MANUAL THERAPY 1/> REGIONS: CPT | Mod: PN

## 2018-01-30 PROCEDURE — 97018 PARAFFIN BATH THERAPY: CPT | Mod: PN

## 2018-01-30 NOTE — PROGRESS NOTES
Patient:  Nadine Ogden [facility] #:  [id number]   Date of Note: 1/30/2018  Referring Physician: Emeli Lui PA-C /Dr. Roberts  Diagnosis:    Encounter Diagnoses   Name Primary?    Range of motion deficit Yes    Trigger finger of right thumb     Thumb weakness        Start Time: 8:50 am  End Time: 9:20 am  Total Time: 30 min  Group Time: -    2  of 15 visits expires 12/31/18    Subjective:   Pt states the her hand has been sore, her incision is also tendon.  Pain: 0 out of 10 pre therapy, can be 5/10 when it is sore.    Objective:   Patient seen by OT this session. Treatment  consist of the following:  Paraffin and manual therapy/joint mobilization    Treatment: Paraffin x 8 min and Manual therapy x 22 min  Patient received paraffin bath to right hand(s) for 8 minutes to increase blood flow, circulation, pain management and for tissue elasticity prior to therex. Scar massage with cocoa butter cream. Gentle stretch of thumb into extension/abduction and into composite flexion. Pt instructed and performed thumb tendon glide x 10 reps.  Pt engaged in yellow clip 3pt pinch strengthening x 3 min.  Pt instructed to add the tendon glide to her HEP.     Plan of care sent to  Dr. Roberts via [facility]      Assessment:  Pt tolerated therapy well with improved flexibility post therapy.  Pt expressed understanding of continuing her HEP.  Pt will continue to benefit from skilled OT intervention. Medical necessity is demonstrated by: Pt continues to be limited in functional and leisurely pursuits. Pain limits patients participation in ADL's. Pt is not able to carryout necessary vocational tasks. Patient continues to requires cues and skilled supervision to complete HEP      Plan:  Continue with plan of care 1-2 x week x 8 weeks  during the certification period from   1/26/18 to 3/22/18  in pursuit of  OT goals.

## 2018-02-05 ENCOUNTER — CLINICAL SUPPORT (OUTPATIENT)
Dept: REHABILITATION | Facility: HOSPITAL | Age: 48
End: 2018-02-05
Payer: COMMERCIAL

## 2018-02-05 DIAGNOSIS — M65.311 TRIGGER FINGER OF RIGHT THUMB: ICD-10-CM

## 2018-02-05 DIAGNOSIS — M79.644 THUMB PAIN, RIGHT: ICD-10-CM

## 2018-02-05 DIAGNOSIS — R29.898 THUMB WEAKNESS: ICD-10-CM

## 2018-02-05 DIAGNOSIS — M25.60 RANGE OF MOTION DEFICIT: ICD-10-CM

## 2018-02-05 PROCEDURE — 97018 PARAFFIN BATH THERAPY: CPT | Mod: PN

## 2018-02-05 PROCEDURE — 97140 MANUAL THERAPY 1/> REGIONS: CPT | Mod: PN

## 2018-02-05 PROCEDURE — 97022 WHIRLPOOL THERAPY: CPT | Mod: PN

## 2018-02-05 PROCEDURE — 97110 THERAPEUTIC EXERCISES: CPT | Mod: PN

## 2018-02-05 NOTE — PROGRESS NOTES
Patient:  Nadine Ogden [facility] #:  [id number]   Date of Note: 2/5/2018  Referring Physician: Emeli Lui PA-C /Dr. Roberts  Diagnosis:    Encounter Diagnoses   Name Primary?    Range of motion deficit     Thumb pain, right     Thumb weakness     Trigger finger of right thumb        Start Time: 10:30 am  End Time: 11:10 am  Total Time: 40 min  Group Time: -    3  of 15 visits expires 12/31/18    Subjective:   Pt states the her hand did well for the parade.  Pain: 0 out of 10 pre therapy, can be 5/10 when it is sore.    Objective:   Patient seen by OT this session. Treatment  consist of the following:  Fluidotherapy and manual therapy/joint mobilization, therapeutic ex    Treatment: fluidotherapy x 8 min and Manual therapy x 16 min, therex x 16 min  Patient received fluidotherapy to right hand(s) for 8 minutes to increase blood flow, circulation, desensitization, sensory re-education and for pain management.Scar massage with cocoa butter cream manually and with round tip tool and mini massager.  Stretching of thumb into extension/abduction and into composite flexion. Isolated MP and DIP joint flexion performed x 10 reps each.  Composite flexion performed x 10 reps.   Pt engaged in thumb exerciser with yellow band resistance x 3 min as well as yellow band resisted thumb extension x 3 min. Pt instructed and encouraged to continue her HEP.     Plan of care sent to  Dr. Roberts via epic      Assessment:  Pt composite thumb flexion achieving DPC at 5th MCP region.  Overall decrease in sensitivity of incision.   Pt will continue to benefit from skilled OT intervention. Medical necessity is demonstrated by: Pt continues to be limited in functional and leisurely pursuits. Pain limits patients participation in ADL's. Pt is not able to carryout necessary vocational tasks. Patient continues to requires cues and skilled supervision to complete HEP      Plan:  Continue with plan of care 1-2 x week x 8 weeks   during the certification period from   1/26/18 to 3/22/18  in pursuit of  OT goals.

## 2018-02-12 ENCOUNTER — TELEPHONE (OUTPATIENT)
Dept: ORTHOPEDICS | Facility: CLINIC | Age: 48
End: 2018-02-12

## 2018-02-12 NOTE — TELEPHONE ENCOUNTER
----- Message from Celestine Crews sent at 2/12/2018 12:31 PM CST -----  Contact: Pt  _x  1st Request  _  2nd Request  _  3rd Request        Who: SALO FIGUEROA [[id number]]    Why: Returning a call back from your office regarding sons appoinment. Please return the call at earliest convenience.   Thanks!    What Number to Call Back:[phone]    When to Expect a call back: (With in 24 hours)

## 2018-02-12 NOTE — TELEPHONE ENCOUNTER
Scheduled to see La on 2/14 @ 11:30. New Xrays ordered. Scheduled for 1045 - told to arrive for 1030am

## 2018-02-15 ENCOUNTER — CLINICAL SUPPORT (OUTPATIENT)
Dept: REHABILITATION | Facility: HOSPITAL | Age: 48
End: 2018-02-15
Payer: COMMERCIAL

## 2018-02-15 DIAGNOSIS — R29.898 THUMB WEAKNESS: ICD-10-CM

## 2018-02-15 DIAGNOSIS — M79.644 THUMB PAIN, RIGHT: ICD-10-CM

## 2018-02-15 DIAGNOSIS — M65.311 TRIGGER FINGER OF RIGHT THUMB: ICD-10-CM

## 2018-02-15 DIAGNOSIS — M25.60 RANGE OF MOTION DEFICIT: Primary | ICD-10-CM

## 2018-02-15 PROCEDURE — 97140 MANUAL THERAPY 1/> REGIONS: CPT | Mod: PN

## 2018-02-15 PROCEDURE — 97110 THERAPEUTIC EXERCISES: CPT | Mod: PN

## 2018-02-15 NOTE — PROGRESS NOTES
Patient:  Nadine Ogden [facility] #:  [id number]   Date of Note: 2/15/2018  Referring Physician: Emeli Lui PA-C /Dr. Roberts  Diagnosis:    Encounter Diagnoses   Name Primary?    Range of motion deficit Yes    Thumb pain, right     Thumb weakness     Trigger finger of right thumb      Start Time: 2:05 pm  End Time: 2:35 pm  Total Time: 30 min  Group Time: -    4  of 15 visits expires 12/31/18    Subjective:   Pt states the worked for 3 days last week and her hand was very sore and painful, I used advil and went to bed.  Pain: 1 out of 10 pre therapy,   Objective:   Patient seen by OT this session. Treatment  consist of the following:   manual therapy/joint mobilization andtherapeutic ex    Treatment: Manual therapy x 20 min, therex x 10 min  Patient received fluidotherapy to right hand(s) for 8 minutes to increase blood flow, circulation, desensitization, sensory re-education and for pain management.Scar massage with cocoa butter cream manually and with round tip tool as well as  mini massager.  Stretching of thumb into composite extension. Isolated MP and DIP joint flexion performed x 10 reps each.   Pt engaged in thumb exerciser with yellow band resistance x 3 min . 3 pt pinch with red clip x 3 min. Digi extensor with red band x 2 min.   Pt instructed and encouraged to continue her HEP and to use ice for her pain.     Plan of care rec'd from  Dr. Roberts for care from 1/26/18 to 3/22/18      Assessment:  Pt composite thumb flexion achieving DPC at 5th MCP region.  Overall decrease in sensitivity of incision.   Pt will continue to benefit from skilled OT intervention. Medical necessity is demonstrated by: Pt continues to be limited in functional and leisurely pursuits. Pain limits patients participation in ADL's. Pt is not able to carryout necessary vocational tasks. Patient continues to requires cues and skilled supervision to complete HEP      Plan:  Continue with plan of care 1-2 x week x 8  weeks  during the certification period from   1/26/18 to 3/22/18  in pursuit of  OT goals.

## 2018-02-27 ENCOUNTER — CLINICAL SUPPORT (OUTPATIENT)
Dept: REHABILITATION | Facility: HOSPITAL | Age: 48
End: 2018-02-27
Payer: COMMERCIAL

## 2018-02-27 DIAGNOSIS — M65.311 TRIGGER FINGER OF RIGHT THUMB: ICD-10-CM

## 2018-02-27 DIAGNOSIS — M25.60 RANGE OF MOTION DEFICIT: Primary | ICD-10-CM

## 2018-02-27 DIAGNOSIS — M79.644 THUMB PAIN, RIGHT: ICD-10-CM

## 2018-02-27 DIAGNOSIS — R29.898 THUMB WEAKNESS: ICD-10-CM

## 2018-02-27 PROCEDURE — 97140 MANUAL THERAPY 1/> REGIONS: CPT | Mod: PN

## 2018-02-27 PROCEDURE — 97110 THERAPEUTIC EXERCISES: CPT | Mod: PN

## 2018-02-27 NOTE — PROGRESS NOTES
Progress Note/ D/C summary    Patient:  Nadine Ogden [facility] #:  [id number]   Date of Note: 2/27/2018  Referring Physician: Emeli Lui PA-C /Dr. Roberts  Diagnosis:    Encounter Diagnoses   Name Primary?    Range of motion deficit Yes    Thumb pain, right     Thumb weakness     Trigger finger of right thumb       Start Time: 8:35 am  End Time: 9:00  am  Total Time: 25 min  Group Time: -    5 of 15 visits expires 12/31/18    Subjective:   Pt states the worked for 3 days last week and her hand was very sore and painful, I used advil and went to bed.  Pain: 1 out of 10 pre therapy,   Objective:   Patient seen by OT this session. Treatment  consist of the following:   manual therapy/joint mobilization andtherapeutic ex    Treatment: Manual therapy x 13 min, therex x 12 min    Re-Assessment as follows:    Range of Motion: right fingers active WFL     Thumb  Left                   Right   MP flex 74  IP flex 7     MP flex 68 (+18)IP flex  59 (+14)     Strength: (BOBBI Dynamometer in lbs.) Average 3 trials, Position II  Right: 70, 65, 55  avg 63 (+23)  Left: 65, 55,55 avg 58#     Pinch Strength: (Pinch Gauge in psi's), Average 3 trials  Mendez Pinch R) 13,12,12, avg 12.3 psi's (+1.3   L) 12,10,12 avg 11.3psi's  3pt Pinch   R) 19, 17, 18  avg 18 psi's(+5.7)  L) 17,16,15 avg 16 psi's                          19,17,18  Scar massage with cocoa butter cream manually and with round tip tool as well as extractor.  Stretching of thumb into composite extension. Pt engaged in thumb exerciser with red band resistance x 3 min . 3 pt pinch with red clip x 3 min. Pt encouraged to continue her HEP .        Patient is a 47 y.o. female referred to Occupational Therapy by  with a referral for eval and treat.  Patient received initial evaluation on 1/25/18 and returned for 4 treatment sessions. Patient had 0 missed visits. Patient's treatment included fluidotherapy, ultrasound, manual therapy, scar  management, pinch and  strengthening. HEP     Assessment:  Pt is demonstrating improvements in AROM of thumb,  and pinch strengths.  Pt able to return to work activities as well.   Goals to be met in 8 weeks:   Patient to be IND with HEP and modalities for pain/edema management- met  Increase ROM 3-5 degrees to increase functional hand use for ADLs/work/leisure activities- met.   Increase  strength 15 lbs. to improve functional grasp for ADLs/work/leisure activitie - met.   Increase pinch 3 psi's to increase IND opening bottles - met    Plan:  Pt will be d/c from formal occupational therapy at this time due to meeting established goals.

## 2020-08-25 DIAGNOSIS — M79.642 LEFT HAND PAIN: Primary | ICD-10-CM

## 2020-08-27 ENCOUNTER — TELEPHONE (OUTPATIENT)
Dept: ORTHOPEDICS | Facility: CLINIC | Age: 50
End: 2020-08-27

## 2020-08-27 NOTE — TELEPHONE ENCOUNTER
Called to reschedule appointment to an earlier or later time. Patient stated that she cannot reschedule at this time.

## 2020-08-28 ENCOUNTER — TELEPHONE (OUTPATIENT)
Dept: ORTHOPEDICS | Facility: CLINIC | Age: 50
End: 2020-08-28

## 2020-08-31 ENCOUNTER — TELEPHONE (OUTPATIENT)
Dept: ORTHOPEDICS | Facility: CLINIC | Age: 50
End: 2020-08-31

## 2020-09-01 ENCOUNTER — HOSPITAL ENCOUNTER (OUTPATIENT)
Dept: RADIOLOGY | Facility: OTHER | Age: 50
Discharge: HOME | End: 2020-09-01
Attending: ORTHOPAEDIC SURGERY
Payer: COMMERCIAL

## 2020-09-01 ENCOUNTER — OFFICE VISIT (OUTPATIENT)
Dept: ORTHOPEDICS | Facility: CLINIC | Age: 50
End: 2020-09-01
Payer: COMMERCIAL

## 2020-09-01 VITALS
WEIGHT: 150 LBS | HEIGHT: 65 IN | HEART RATE: 69 BPM | SYSTOLIC BLOOD PRESSURE: 111 MMHG | DIASTOLIC BLOOD PRESSURE: 70 MMHG | BODY MASS INDEX: 24.99 KG/M2

## 2020-09-01 DIAGNOSIS — M65.312 TRIGGER FINGER OF LEFT THUMB: Primary | ICD-10-CM

## 2020-09-01 DIAGNOSIS — M79.642 LEFT HAND PAIN: ICD-10-CM

## 2020-09-01 PROCEDURE — 99213 PR OFFICE/OUTPT VISIT, EST, LEVL III, 20-29 MIN: ICD-10-PCS | Mod: 25,S$GLB,, | Performed by: PHYSICIAN ASSISTANT

## 2020-09-01 PROCEDURE — 73130 X-RAY EXAM OF HAND: CPT | Mod: TC,FY,LT

## 2020-09-01 PROCEDURE — 99999 PR PBB SHADOW E&M-EST. PATIENT-LVL III: ICD-10-PCS | Mod: PBBFAC,,, | Performed by: PHYSICIAN ASSISTANT

## 2020-09-01 PROCEDURE — 3008F PR BODY MASS INDEX (BMI) DOCUMENTED: ICD-10-PCS | Mod: CPTII,S$GLB,, | Performed by: PHYSICIAN ASSISTANT

## 2020-09-01 PROCEDURE — 20550 PR INJECT TENDON SHEATH/LIGAMENT: ICD-10-PCS | Mod: FA,S$GLB,, | Performed by: PHYSICIAN ASSISTANT

## 2020-09-01 PROCEDURE — 73130 X-RAY EXAM OF HAND: CPT | Mod: 26,LT,, | Performed by: RADIOLOGY

## 2020-09-01 PROCEDURE — 73130 XR HAND COMPLETE 3 VIEW LEFT: ICD-10-PCS | Mod: 26,LT,, | Performed by: RADIOLOGY

## 2020-09-01 PROCEDURE — 99999 PR PBB SHADOW E&M-EST. PATIENT-LVL III: CPT | Mod: PBBFAC,,, | Performed by: PHYSICIAN ASSISTANT

## 2020-09-01 PROCEDURE — 76942 PR U/S GUIDANCE FOR NEEDLE GUIDANCE: ICD-10-PCS | Mod: S$GLB,,, | Performed by: PHYSICIAN ASSISTANT

## 2020-09-01 PROCEDURE — 3008F BODY MASS INDEX DOCD: CPT | Mod: CPTII,S$GLB,, | Performed by: PHYSICIAN ASSISTANT

## 2020-09-01 PROCEDURE — 99213 OFFICE O/P EST LOW 20 MIN: CPT | Mod: 25,S$GLB,, | Performed by: PHYSICIAN ASSISTANT

## 2020-09-01 PROCEDURE — 76942 ECHO GUIDE FOR BIOPSY: CPT | Mod: S$GLB,,, | Performed by: PHYSICIAN ASSISTANT

## 2020-09-01 PROCEDURE — 20550 NJX 1 TENDON SHEATH/LIGAMENT: CPT | Mod: FA,S$GLB,, | Performed by: PHYSICIAN ASSISTANT

## 2020-09-01 RX ORDER — LIDOCAINE HYDROCHLORIDE 10 MG/ML
0.5 INJECTION, SOLUTION EPIDURAL; INFILTRATION; INTRACAUDAL; PERINEURAL
Status: COMPLETED | OUTPATIENT
Start: 2020-09-01 | End: 2020-09-01

## 2020-09-01 RX ORDER — DEXAMETHASONE SODIUM PHOSPHATE 4 MG/ML
4 INJECTION, SOLUTION INTRA-ARTICULAR; INTRALESIONAL; INTRAMUSCULAR; INTRAVENOUS; SOFT TISSUE
Status: COMPLETED | OUTPATIENT
Start: 2020-09-01 | End: 2020-09-01

## 2020-09-01 RX ADMIN — DEXAMETHASONE SODIUM PHOSPHATE 4 MG: 4 INJECTION, SOLUTION INTRA-ARTICULAR; INTRALESIONAL; INTRAMUSCULAR; INTRAVENOUS; SOFT TISSUE at 01:09

## 2020-09-01 RX ADMIN — LIDOCAINE HYDROCHLORIDE 5 MG: 10 INJECTION, SOLUTION EPIDURAL; INFILTRATION; INTRACAUDAL; PERINEURAL at 01:09

## 2020-09-01 NOTE — PROGRESS NOTES
Subjective:      Patient ID: Nadine Frances is a 50 y.o. female.    Chief Complaint: Pain of the Left Hand      HPI  Nadine Frances is a right hand dominant 50 y.o. female presenting today for evaluation of left thumb triggering. She has a history of right trigger thumb release 1/2018.  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  She reports that the left thumb sticks at the IP joint, and her pain is at the MCP.  She has tried ibuprofen, but reports that she has to be careful taking this due to her stomach. Denies finger numbness or tingling.       Review of patient's allergies indicates:  No Known Allergies      Current Outpatient Medications   Medication Sig Dispense Refill    amLODIPine (NORVASC) 5 MG tablet Take 5 mg by mouth every evening.       ascorbic acid, vitamin C, (VITAMIN C) 1000 MG tablet Take 1,000 mg by mouth once daily.      aspirin (ECOTRIN) 81 MG EC tablet Take 81 mg by mouth every evening.       cetirizine (ZYRTEC) 10 MG tablet Take 10 mg by mouth once daily.      hydroCHLOROthiazide (HYDRODIURIL) 12.5 MG Tab Take 12.5 mg by mouth once daily.      b complex vitamins tablet Take 1 tablet by mouth once daily.      Lactobacillus rhamnosus GG (CULTURELLE) 10 billion cell capsule Take 1 capsule by mouth once daily.      magnesium 30 mg Tab Take by mouth every evening.       potassium chloride (KLOR-CON) 10 MEQ TbSR Take 10 mEq by mouth once.      vitamin D 1000 units Tab Take 1,000 Units by mouth every evening.        Current Facility-Administered Medications   Medication Dose Route Frequency Provider Last Rate Last Dose    dexamethasone injection 4 mg  4 mg Other 1 time in [facility]        lidocaine (PF) 10 mg/ml (1%) injection 5 mg  0.5 mL Other 1 time in  "[facility]           Past Medical History:   Diagnosis Date    Cancer     basal cell     Hypertension     LBBB (left bundle branch block)        Past Surgical History:   Procedure Laterality Date    BREAST SURGERY Bilateral     augmentation    HAND SURGERY           Review of Systems:  Review of Systems   Constitution: Negative for chills and fever.   Skin: Negative for rash and suspicious lesions.   Musculoskeletal:        See HPI   Neurological: Negative for dizziness, headaches, light-headedness, numbness and paresthesias.   Psychiatric/Behavioral: Negative for depression. The patient is not nervous/anxious.          OBJECTIVE:     PHYSICAL EXAM:  Height: 5' 5" (165.1 cm) Weight: 68 kg (150 lb)  Vitals:    09/01/20 1306   BP: 111/70   Pulse: 69   Weight: 68 kg (150 lb)   Height: 5' 5" (1.651 m)   PainSc:   7     General    Vitals reviewed.  Constitutional: She is oriented to person, place, and time. She appears well-developed and well-nourished.   HENT:   Head: Normocephalic and atraumatic.   Neck: Normal range of motion.   Cardiovascular: Normal rate.    Pulmonary/Chest: Effort normal. No respiratory distress.   Neurological: She is alert and oriented to person, place, and time.   Psychiatric: She has a normal mood and affect. Her behavior is normal. Judgment and thought content normal.             Musculoskeletal:  Well-healed surgical scar right palm.  No scars left hand.  She is tender to palpation over the left thumb A1 pulley, otherwise nontender.  Palpable appreciated the the left triggering today.  Neurovascularly-good sensation and motor function capillary refill 2+ radial pulses.    RADIOGRAPHS:  Left hand XRay, 9/1/2020  FINDINGS:  Three views:  There is mild ulnar negative variance.  No fracture, dislocation, or bone destruction seen.  No trauma seen.    Comments: I have personally reviewed the imaging and I agree with the above radiologist's report.    ASSESSMENT/PLAN: "   Nadine was seen today for pain.    Diagnoses and all orders for this visit:    Trigger finger of left thumb    Other orders  -     dexamethasone injection 4 mg  -     lidocaine (PF) 10 mg/ml (1%) injection 5 mg        - discussed patient's symptoms and physical exam findings, discussed trigger finger.  Discussed conservative and surgical treatment options.  Patient is interested in conservative treatment.  - left thumb trigger finger injection today  - ice and bracing as needed  - follow-up in 6-8 weeks  - call with questions or concerns    PROCEDURE NOTE:  I have explained the risks, benefits, and alternatives of the procedure in detail.  The patient voices understanding and all questions have been answered, consents to injection. Pause for timeout.  After a steril prep of the skin in the normal fashion, the level of the A-1 pulley of the left thumb is injected using a 25 gauge needle from the volar approach with a  combination of 0.5 cc 1% plain Lidocaine and 4 mg of dexamethasone.  The patient is cautioned and immediate relief of pain is secondary to the local anesthetic and will be temporary.  After the anesthetic wears off there may be a increase in pain that may last for a few hours or a few days and they should use ice to help alleviate this flair up of pain.   Ultrasound was utilized for this injection for improved visualization.      Disclaimer: This note has been generated using voice-recognition software. There may be typographical errors that have been missed during proof-reading.

## 2020-10-13 ENCOUNTER — OFFICE VISIT (OUTPATIENT)
Dept: ORTHOPEDICS | Facility: CLINIC | Age: 50
End: 2020-10-13
Payer: COMMERCIAL

## 2020-10-13 VITALS
SYSTOLIC BLOOD PRESSURE: 133 MMHG | HEART RATE: 79 BPM | HEIGHT: 65 IN | BODY MASS INDEX: 24.99 KG/M2 | WEIGHT: 150 LBS | DIASTOLIC BLOOD PRESSURE: 86 MMHG

## 2020-10-13 DIAGNOSIS — M65.312 TRIGGER FINGER OF LEFT THUMB: Primary | ICD-10-CM

## 2020-10-13 DIAGNOSIS — R20.0 FINGER NUMBNESS: ICD-10-CM

## 2020-10-13 PROCEDURE — 20550 PR INJECT TENDON SHEATH/LIGAMENT: ICD-10-PCS | Mod: FA,S$GLB,, | Performed by: PHYSICIAN ASSISTANT

## 2020-10-13 PROCEDURE — 3008F PR BODY MASS INDEX (BMI) DOCUMENTED: ICD-10-PCS | Mod: CPTII,S$GLB,, | Performed by: PHYSICIAN ASSISTANT

## 2020-10-13 PROCEDURE — 99999 PR PBB SHADOW E&M-EST. PATIENT-LVL IV: CPT | Mod: PBBFAC,,, | Performed by: PHYSICIAN ASSISTANT

## 2020-10-13 PROCEDURE — 20550 NJX 1 TENDON SHEATH/LIGAMENT: CPT | Mod: FA,S$GLB,, | Performed by: PHYSICIAN ASSISTANT

## 2020-10-13 PROCEDURE — 99214 OFFICE O/P EST MOD 30 MIN: CPT | Mod: 25,S$GLB,, | Performed by: PHYSICIAN ASSISTANT

## 2020-10-13 PROCEDURE — 99999 PR PBB SHADOW E&M-EST. PATIENT-LVL IV: ICD-10-PCS | Mod: PBBFAC,,, | Performed by: PHYSICIAN ASSISTANT

## 2020-10-13 PROCEDURE — 3008F BODY MASS INDEX DOCD: CPT | Mod: CPTII,S$GLB,, | Performed by: PHYSICIAN ASSISTANT

## 2020-10-13 PROCEDURE — 99214 PR OFFICE/OUTPT VISIT, EST, LEVL IV, 30-39 MIN: ICD-10-PCS | Mod: 25,S$GLB,, | Performed by: PHYSICIAN ASSISTANT

## 2020-10-13 RX ORDER — DEXAMETHASONE SODIUM PHOSPHATE 4 MG/ML
4 INJECTION, SOLUTION INTRA-ARTICULAR; INTRALESIONAL; INTRAMUSCULAR; INTRAVENOUS; SOFT TISSUE
Status: COMPLETED | OUTPATIENT
Start: 2020-10-13 | End: 2020-10-13

## 2020-10-13 RX ORDER — LIDOCAINE HYDROCHLORIDE 10 MG/ML
0.5 INJECTION, SOLUTION EPIDURAL; INFILTRATION; INTRACAUDAL; PERINEURAL
Status: COMPLETED | OUTPATIENT
Start: 2020-10-13 | End: 2020-10-13

## 2020-10-13 RX ADMIN — LIDOCAINE HYDROCHLORIDE 5 MG: 10 INJECTION, SOLUTION EPIDURAL; INFILTRATION; INTRACAUDAL; PERINEURAL at 11:10

## 2020-10-13 RX ADMIN — DEXAMETHASONE SODIUM PHOSPHATE 4 MG: 4 INJECTION, SOLUTION INTRA-ARTICULAR; INTRALESIONAL; INTRAMUSCULAR; INTRAVENOUS; SOFT TISSUE at 11:10

## 2020-10-13 NOTE — PROGRESS NOTES
"Subjective:      Patient ID: Nadine Frances is a 50 y.o. female.    Chief Complaint: Pain of the Left Hand      HPI  Nadine Frances is a right hand dominant 50 y.o. female presenting today for evaluation of left thumb triggering. She underwent left thumb trigger finger injection at her last visit, reports some improvement including less triggering. She reports that the thumb feels "really stiff" and she has less motion at the IP joint. She has a history of right trigger thumb release 1/2018.  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  She reports that the left thumb sticks at the IP joint, and her pain is at the MCP.  She has intermittent swelling of the hands in the morning and finger tingling at night.  She has tried ibuprofen, but reports that she has to be careful taking this due to her stomach. She has not been using carpal tunnel braces.      Review of patient's allergies indicates:  No Known Allergies      Current Outpatient Medications   Medication Sig Dispense Refill    amLODIPine (NORVASC) 5 MG tablet Take 5 mg by mouth every evening.       b complex vitamins tablet Take 1 tablet by mouth once daily.      cetirizine (ZYRTEC) 10 MG tablet Take 10 mg by mouth once daily.      hydroCHLOROthiazide (HYDRODIURIL) 12.5 MG Tab Take 12.5 mg by mouth once daily.      Lactobacillus rhamnosus GG (CULTURELLE) 10 billion cell capsule Take 1 capsule by mouth once daily.      vitamin D 1000 units Tab Take 1,000 Units by mouth every evening.       ascorbic acid, vitamin C, (VITAMIN C) 1000 MG tablet Take 1,000 mg by mouth once daily.      aspirin (ECOTRIN) 81 MG EC tablet Take 81 mg by mouth every evening.       magnesium 30 mg Tab Take by mouth every evening.       potassium chloride (KLOR-CON) 10 MEQ " "TbSR Take 10 mEq by mouth once.       Current Facility-Administered Medications   Medication Dose Route Frequency Provider Last Rate Last Dose    dexamethasone injection 4 mg  4 mg Other 1 time in [facility]        lidocaine (PF) 10 mg/ml (1%) injection 5 mg  0.5 mL Other 1 time in [facility]           Past Medical History:   Diagnosis Date    Cancer     basal cell     Hypertension     LBBB (left bundle branch block)        Past Surgical History:   Procedure Laterality Date    BREAST SURGERY Bilateral     augmentation    HAND SURGERY           Review of Systems:  Review of Systems   Constitution: Negative for chills and fever.   Skin: Negative for rash and suspicious lesions.   Musculoskeletal:        See HPI   Neurological: Negative for dizziness, headaches, light-headedness, numbness and paresthesias.   Psychiatric/Behavioral: Negative for depression. The patient is not nervous/anxious.          OBJECTIVE:     PHYSICAL EXAM:  Height: 5' 5" (165.1 cm) Weight: 68 kg (150 lb)  Vitals:    10/13/20 1031   BP: 133/86   Pulse: 79   Weight: 68 kg (150 lb)   Height: 5' 5" (1.651 m)   PainSc:   4     General    Vitals reviewed.  Constitutional: She is oriented to person, place, and time. She appears well-developed and well-nourished.   HENT:   Head: Normocephalic and atraumatic.   Neck: Normal range of motion.   Cardiovascular: Normal rate.    Pulmonary/Chest: Effort normal. No respiratory distress.   Neurological: She is alert and oriented to person, place, and time.   Psychiatric: She has a normal mood and affect. Her behavior is normal. Judgment and thought content normal.             Musculoskeletal:  Well-healed surgical scar right palm.  No scars left hand.  She is tender to palpation over the left thumb A1 pulley, otherwise nontender.  Palpable thickening appreciated, no triggering today.  Motion tested in isolation - good flexion and extension at all joints left " thumb.   Neurovascularly-good sensation and motor function capillary refill 2+ radial pulses.  Negative Tinel's bilaterally at the carpal tunnel, Guyon's canal, and cubital tunnel.  Positive Durkan's bilaterally.    RADIOGRAPHS:  Left hand XRay, 9/1/2020  FINDINGS:  Three views:  There is mild ulnar negative variance.  No fracture, dislocation, or bone destruction seen.  No trauma seen.    Comments: I have personally reviewed the imaging and I agree with the above radiologist's report.    ASSESSMENT/PLAN:   Nadine was seen today for pain.    Diagnoses and all orders for this visit:    Trigger finger of left thumb  -     Ambulatory referral/consult to Physical/Occupational Therapy    Finger numbness  -     Ambulatory referral/consult to Physical/Occupational Therapy    Other orders  -     dexamethasone injection 4 mg  -     lidocaine (PF) 10 mg/ml (1%) injection 5 mg        - discussed patient's symptoms and physical exam findings, discussed trigger finger.  Discussed conservative and surgical treatment options.  Patient is interested in conservative treatment.  - left thumb trigger finger injection today  - restart use of carpal tunnel braces  - ice and bracing as needed  - orders for OT  - follow-up in 6 weeks  - call with questions or concerns    PROCEDURE NOTE:  I have explained the risks, benefits, and alternatives of the procedure in detail.  The patient voices understanding and all questions have been answered, consents to injection. Pause for timeout.  After a steril prep of the skin in the normal fashion, the level of the A-1 pulley of the left thumb is injected using a 25 gauge needle from the volar approach with a  combination of 0.5 cc 1% plain Lidocaine and 4 mg of dexamethasone.  The patient is cautioned and immediate relief of pain is secondary to the local anesthetic and will be temporary.  After the anesthetic wears off there may be a increase in pain that may last for a few hours or a few days and  they should use ice to help alleviate this flair up of pain.       Disclaimer: This note has been generated using voice-recognition software. There may be typographical errors that have been missed during proof-reading.

## 2020-11-11 NOTE — PROGRESS NOTES
[facility] Occupational Therapy  Initial Evaluation     Date: 11/12/2020  Name: Nadine Frances  Clinic Number: [id number]    Therapy Diagnosis:   Encounter Diagnoses   Name Primary?    Trigger finger of right thumb Yes    Thumb pain, right     Range of motion deficit     Thumb weakness      Physician: Sherlyn Mascorro PA    Physician Orders: Eval and treat, modalities as needed  Medical Diagnosis: M65.312 (ICD-10-CM) - Trigger finger of left thumb R20.0 (ICD-10-CM) - Finger numbness   Date of Injury/Onset: June  Evaluation Date: 11/12/2020  Insurance Authorization Period Expiration: 12/31/2020  Plan of Care Certification Period: 12/26/2020  Date of Return to MD: 11/24    Visit #  Visits authorized: 1 / 20    FOTO: initial eval      Time In:10:32AM  Time Out: 11:25 AM   Total treatment time: 53 minutes  Total Timed minutes: 53 minutes    Precautions:  Standard    Subjective     Involved Side: Left  Dominant Side: Right  Date of Onset: June   Mechanism of Injury: Repetitive motion involved with job duties ()  History of Current Condition: Pt developed trigger finger A1 and A2  in thumb. She reported she received 2 injections, with last one being on 10/16.  Previous Therapy: October 20th started wearing carpal tunnel brace at night. Trigger finger since June     Past Medical History/Physical Systems Review:   Nadine Frances  has a past medical history of Cancer, Hypertension, and LBBB (left bundle branch block).    Nadine Frances  has a past surgical history that includes Breast surgery (Bilateral) and Hand surgery.    Nadine has a current medication list which includes the following prescription(s): amlodipine, ascorbic acid (vitamin c), aspirin, b complex vitamins, cetirizine, hydrochlorothiazide, lactobacillus rhamnosus gg, magnesium, potassium chloride, and vitamin d.    Review of patient's allergies indicates:  No Known Allergies     Patient's Goals  for Therapy: To return to full function without pain     Pain:  Functional Pain Scale Rating 0-10:   5/10 on average  3/10 at best  8/10 at worst  Location: MCP thumb and dorsum of proximal phalanx   Description: Burning and Throbbing  Aggravating Factors: nothing  Easing Factors: 600 mg motrin    Occupation:  [employer]   Working presently: employed  Duties:  and consultant    Functional Limitations/Social History:    Previous functional status includes: Independent with all ADLs.     Current FunctionalStatus   Home/Living environment : lives with their family      Limitation of Functional Status as follows:   ADLs/IADLs:     - Feeding: IND    - Bathing: IND    - Dressing/Grooming: IND    - Driving: IND     Leisure: working out at gym and travel         Objective     Observation/Appearance: noted guarding of L hand.         Edema. Measured in centimeters.   11/13/2020 11/13/2020    Left Right   Index:       P1 6 6.5    PIP 5.7 6.4   P2 4.9 5.1   Long:       P1 6 6.7    PIP 5.9 6.4   P2 5.1 5.4   Thumb:     Prox. Phalanx 6.1 6.4   IP 6.3 6.9   Distal Phalanx 6.1      Elbow and Wrist ROM. Measured in degrees.   11/13/2020 11/13/2020    Left Right   Wrist Ext/Flex 70/75 63/WNL   Wrist RD/UD 10/25 8/29     Hand ROM. Measured in degrees.   11/13/2020 11/13/2020    Left Right        Index: MP  88 84              PIP     106 94              DIP 78 76              DONOVAN 272 254        Long:  MP 98 94               103              DIP 84 88              DONOVAN 284 285        Ring:   MP 90 85               104              DIP 62 64              DONOVAN 261 253        Small:  MP 98 93               PIP 90 90               DIP 84 78              DONOVAN 272 261        Thumb: MP /48 /70                IP /30 /63       Rad ADD/ABD /55 /41       Pal ADD/ABD /52 /43          Opposition pinky Pinky       Strength (Dynamometer) and Pinch Strength (Pinch Gauge)  Measured in pounds.   11/13/2020 11/13/2020    Left  Right   Rung II 49 45   Key Pinch 10 14   3pt Pinch 9 10   2pt Pinch 3 10     Sensation   11/12/2020 11/12/2020    Left Right   Pansey Rachel     Normal 1.65-2.83     Diminished Light Touch 3.22-3.61 +    Diminished Protective 3.84-4.31             CMS Impairment/Limitation/Restriction for FOTO Intiial eval Survey    Therapist reviewed FOTO scores for Nadine Frances on 11/12/2020.   FOTO documents entered into [facility] A/S - see Media section.    Limitation Score: 42%         Treatment     Treatment Time In: 11:00  Treatment Time Out: 11:20  Total Treatment time separate from Evaluation time:20      Nadine received the following manual therapy techniques for 5 minutes:   - passive stretching for 5 minutes to relieve intrinsic tightness    Nadine received therapeutic exercises for 15 minutes including:  -went over HEP exercises and pt demonstrated good understanding and performed them to therapist       Home Exercise Program/Education:  Issued HEP (see patient instructions in EMR) and educated on modality use for pain management . Exercises were reviewed and Nadine was able to demonstrate them prior to the end of the session.   Pt received a written copy of exercises to perform at home. Nadine demonstrated good  understanding of the education provided.  Pt was advised to perform these exercises free of pain, and to stop performing them if pain occurs.    Patient/Family Education: role of OT, goals for OT, scheduling/cancellations - pt verbalized understanding. Discussed insurance limitations with patient.    Additional Education provided: brace during walking     Assessment     Nadine Frances is a 50 y.o. female referred to outpatient occupational therapy and presents with a medical diagnosis of L thumb A1 pully trigger finger, resulting in increased pain, decreased range of motion, and decreased sensation  and demonstrates limitations as described in the chart below. Following medical record  review it is determined that pt will benefit from occupational therapy services in order to maximize pain free and/or functional use of left hand. The following goals were discussed with the patient and patient is in agreement with them as to be addressed in the treatment plan. The patient's rehab potential is Good.     Anticipated barriers to occupational therapy: job duties and frequent traveling.   Pt has no cultural, educational or language barriers to learning provided.    Profile and History Assessment of Occupational Performance Level of Clinical Decision Making Complexity Score   Occupational Profile:   Nadine Frances is a 50 y.o. female who lives with their family and is currently employed as . Nadine Frances has difficulty with  grooming and dressing  phone/computer use and housework/household chores  affecting his/her daily functional abilities. His/her main goal for therapy is be able to use L hand without dropping items.     Comorbidities:   none    Medical and Therapy History Review:   Brief               Performance Deficits    Physical:  Edema   Strength  Pinch Strength  Fine Motor Coordination  Tactile Functions  Pain    Cognitive:  No Deficits    Psychosocial:    Habits  Routines  Rituals     Clinical Decision Making:  low    Assessment Process:  Problem-Focused Assessments    Modification/Need for Assistance:  Not Necessary    Intervention Selection:  Several Treatment Options       low  Based on PMHX, co morbidities , data from assessments and functional level of assistance required with task and clinical presentation directly impacting function.         Goals:   The following goals were discussed with the patient and patient is in agreement with them as to be addressed in the treatment plan.   Long Term Goals (LTGs); to be met by discharge.  LTG #1: Pt will report a pain level of 1 out of 10 with functional grasping activities   LTG #2: Pt will demo improved  FOTO score by 20 points.   LTG #3: Pt will return to prior level of function for ADLs and household management.     Short Term Goals (STGs); to be met within 4 weeks (11/11/20).  STG #1a: Pt will report 4 out of 10 pain level with functional grasping activities .  STG #2a: Pt will report/demo Modified [place] with using hair tools at work .  STG #3a: Pt will demonstrate independence with issued HEP.   STG #3b: Pt will demo improved wrist and hand DONOVAN by 10-20 degrees needed to aid with daily ADLs .        Plan   Certification Period/Plan of care expiration: 11/12/2020 to 12/25/2020.    Outpatient Occupational Therapy 2 times weekly for 6 weeks to include the following interventions: Paraffin, Fluidotherapy, Therapeutic exercises/activities., Strengthening, Edema Control and Energy Conservation.      Carissa South OTR/L

## 2020-11-12 ENCOUNTER — CLINICAL SUPPORT (OUTPATIENT)
Dept: REHABILITATION | Facility: HOSPITAL | Age: 50
End: 2020-11-12
Attending: PHYSICIAN ASSISTANT
Payer: COMMERCIAL

## 2020-11-12 DIAGNOSIS — R29.898 THUMB WEAKNESS: ICD-10-CM

## 2020-11-12 DIAGNOSIS — M65.311 TRIGGER FINGER OF RIGHT THUMB: Primary | ICD-10-CM

## 2020-11-12 DIAGNOSIS — M25.60 RANGE OF MOTION DEFICIT: ICD-10-CM

## 2020-11-12 DIAGNOSIS — M79.644 THUMB PAIN, RIGHT: ICD-10-CM

## 2020-11-12 PROCEDURE — 97110 THERAPEUTIC EXERCISES: CPT | Mod: PN

## 2020-11-12 PROCEDURE — 97165 OT EVAL LOW COMPLEX 30 MIN: CPT | Mod: PN

## 2020-11-12 NOTE — PATIENT INSTRUCTIONS
"[facility], OCCUPATIONAL THERAPY  HOME EXERCISE PROGRAM            Complete the following exercises for 10 repetitions, 3x/day:           AROM: Wrist Flexion / Extension               Bend your wrist forward and back as far as possible.          AROM: Wrist Radial / Ulnar Deviation  Bend your wrist from side to side as far as possible.      AROM: DIP Flexion / Extension  Pinch middle knuckle to prevent bending.   Bend end knuckle until stretch is felt. Hold   3 seconds. Relax. Straighten finger as far as possible.      AROM: PIP Flexion / Extension  Pinch bottom knuckle  to prevent bending.   Actively bend middle knuckle until stretch is felt.   Hold 3 seconds. Relax. Straighten finger as far as possible.          AROM: Isolated MCP Flexion / Extension ("Wave")   Bend only your large, bottom knuckles. Hold 3 seconds.   Keep the tips of your fingers straight. Straighten fingers.      AROM: Isolated IPJ Flexion / Extension ("Hook")   Bend only your middle and end knuckles. Hold 3 seconds.   Straighten your fingers.                    AROM: Composte Extension ("Finger Lifts")  Lift your finger off of the table one at a time. Hold 3 seconds.   Relax your finger.      AROM: Abduction / Adduction  With hand flat on table, spread all fingers apart,   then bring them together as close as possible.      AROM: Thumb IP Flexion / Extension  Brace thumb below tip joint. Bend joint as far as   possible then straighten.            AROM: Radial Adduction / Abduction  Place your palm flat on the table. Move thumb out   to side. Move back alongside index finger.                                       AROM: Palmar Adduction / Abduction   Rest your small finger on the table. Move thumb   sideways, out and away from   palm. Move back to rest along palm.                        AROM: Opposition   Touch tip of thumb to nail tip of each  finger in turn, making an "O" shape.      AROM: Composite Movement Circumduction  Make " "clockwise circles with thumb. Reverse and make counterclockwise   circles   with thumb.                                    AROM: Composite Flesion ("Pinky Slides")  Touch thumb to tip of small finger. Slide thumb down   small finger into palm. **stretch between reps        AROM: MP Extension   With palm on table, lift thumb up.   Hold 3 seconds. Relax and lower thumb.      Therapist: PADMINI Darling/LEANNE        "

## 2020-11-20 ENCOUNTER — CLINICAL SUPPORT (OUTPATIENT)
Dept: REHABILITATION | Facility: HOSPITAL | Age: 50
End: 2020-11-20
Attending: PHYSICIAN ASSISTANT
Payer: COMMERCIAL

## 2020-11-20 DIAGNOSIS — R29.898 THUMB WEAKNESS: ICD-10-CM

## 2020-11-20 DIAGNOSIS — M25.60 RANGE OF MOTION DEFICIT: ICD-10-CM

## 2020-11-20 DIAGNOSIS — M65.311 TRIGGER FINGER OF RIGHT THUMB: Primary | ICD-10-CM

## 2020-11-20 DIAGNOSIS — M79.644 THUMB PAIN, RIGHT: ICD-10-CM

## 2020-11-20 PROCEDURE — 97110 THERAPEUTIC EXERCISES: CPT | Mod: PN

## 2020-11-20 PROCEDURE — 97022 WHIRLPOOL THERAPY: CPT | Mod: PN

## 2020-11-20 PROCEDURE — 97140 MANUAL THERAPY 1/> REGIONS: CPT | Mod: PN

## 2020-11-20 NOTE — PROGRESS NOTES
"  Occupational Therapy Daily Treatment Note      Date: 11/20/2020  Name: Nadine Frances  Clinic Number: [id number]    Therapy Diagnosis:   Encounter Diagnoses   Name Primary?    Trigger finger of right thumb Yes    Thumb pain, right     Range of motion deficit     Thumb weakness      Physician: Sherlyn Mascorro PA  Physician Orders: Eval and treat, modalities as needed  Medical Diagnosis: M65.312 (ICD-10-CM) - Trigger finger of left thumb R20.0 (ICD-10-CM) - Finger numbness   Date of Injury/Onset: June  Evaluation Date: 11/12/2020  Insurance Authorization Period Expiration: 12/31/2020  Plan of Care Certification Period: 12/26/2020  Date of Return to MD: 11/24     Visit #  Visits authorized: 2 / 20     FOTO: initial eval        Time In:12:48 PM  Time Out: 01:29 PM  Total treatment time: 41 minutes  Total Timed minutes:  41  minutes     Precautions:  Standard      Subjective     Pt reports: " 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 I can't wear brace when I work."  she was compliant with home exercise program given last session.   Response to previous treatment: increase soreness due to   Functional change: none    Pain: 6/10  Location: left thumb     Objective       Nadine received the following supervised modalities after being cleared for contradictions for 10 minutes:   - fluidotherapy, continuous air, 110 degrees to increase blood flow, promote tissue extensibility, and decrease pain    Nadine received the following manual therapy techniques for 15 minutes:   - STM to surrounding thumb musculature (thenar eminence, hypothenar eminence, webspace)  - pressure release in webspace   - pin and stretch to promote tendon gliding near A1 pulley  - KT tape on L thumb to prevent triggering     Nadine received therapeutic exercises for 16 minutes including:  - wrist AROM flex/ext x 20   - wrist AROM RD/UD x 20  - thumb AROM " radial ABD/ADD and palmar ABD/ADD x 20   - flexor tendon glides through all positions x 10   - IP jt blocking x 20   - digit abduction 1 min  - finger lifts 1 min      Home Exercises and Education Provided     Education provided:   - monitor tape applied to L hand   - Progress towards goals     Written Home Exercises Provided: Patient instructed to cont prior HEP.  Exercises were reviewed and Nadine was able to demonstrate them prior to the end of the session.  Nadine demonstrated good  understanding of the education provided.   .   See EMR under Patient Instructions for exercises provided prior visit.     Assessment       Nadine is progressing well towards her goals and there are no updates to goals at this time. Pt prognosis continues as Good. Pt will continue to benefit from skilled outpatient occupational therapy to address the deficits listed in the problem list on initial evaluation, provide pt/family education and to maximize pt's level of independence in the home and community environment.     Anticipated barriers to continued occupational therapy: work caseload     Pt's spiritual, cultural and educational needs considered and pt agreeable to plan of care and goals.    Goals     Goals:   The following goals were discussed with the patient and patient is in agreement with them as to be addressed in the treatment plan.   Long Term Goals (LTGs); to be met by discharge.  LTG #1: Pt will report a pain level of 1 out of 10 with functional grasping activities    LTG #2: Pt will demo improved FOTO score by 20 points.   LTG #3: Pt will return to prior level of function for ADLs and household management.      Short Term Goals (STGs); to be met within 4 weeks (11/11/20).  STG #1a: Pt will report 4 out of 10 pain level with functional grasping activities.  Progressing   STG #2a: Pt will report/demo Modified [place] with using hair tools at work . Progressing   STG #3a: Pt will demonstrate independence with  issued HEP. Progressing   STG #3b: Pt will demo improved wrist and hand DONOVAN by 10-20 degrees needed to aid with daily ADLs . Progressing       Plan     Continue skilled occupational therapy with individualized plan of care focusing on decreasing pain, increasing ROM, and improving tolerance for gripping activities required to complete daily activities of living       Updates/Grading for next session: increase reps with AROM    Carissa South OTR/L

## 2020-11-27 ENCOUNTER — CLINICAL SUPPORT (OUTPATIENT)
Dept: REHABILITATION | Facility: HOSPITAL | Age: 50
End: 2020-11-27
Attending: PHYSICIAN ASSISTANT
Payer: COMMERCIAL

## 2020-11-27 DIAGNOSIS — M79.644 THUMB PAIN, RIGHT: ICD-10-CM

## 2020-11-27 DIAGNOSIS — M25.60 RANGE OF MOTION DEFICIT: ICD-10-CM

## 2020-11-27 DIAGNOSIS — R29.898 THUMB WEAKNESS: ICD-10-CM

## 2020-11-27 DIAGNOSIS — M65.311 TRIGGER FINGER OF RIGHT THUMB: Primary | ICD-10-CM

## 2020-11-27 PROCEDURE — 97140 MANUAL THERAPY 1/> REGIONS: CPT | Mod: PN

## 2020-11-27 PROCEDURE — 97022 WHIRLPOOL THERAPY: CPT | Mod: PN

## 2020-11-27 PROCEDURE — 97110 THERAPEUTIC EXERCISES: CPT | Mod: PN

## 2020-11-27 NOTE — PROGRESS NOTES
"  Occupational Therapy Daily Treatment Note      Date: 11/27/2020  Name: Nadine Frances  Clinic Number: [id number]    Therapy Diagnosis:   Encounter Diagnoses   Name Primary?    Trigger finger of right thumb Yes    Thumb pain, right     Range of motion deficit     Thumb weakness      Physician: Sherlyn Mascorro PA  Physician Orders: Eval and treat, modalities as needed  Medical Diagnosis: M65.312 (ICD-10-CM) - Trigger finger of left thumb R20.0 (ICD-10-CM) - Finger numbness   Date of Injury/Onset: June  Evaluation Date: 11/12/2020  Insurance Authorization Period Expiration: 12/31/2020  Plan of Care Certification Period: 12/26/2020  Date of Return to MD: not yet scheduled      Visit #  Visits authorized: 3 / 20     FOTO: initial eval        Time In:9:43 AM  Time Out: 10:28 AM  Total treatment time: 45 minutes  Total Timed minutes:  45  minutes     Precautions:  Standard      Subjective     Pt reports: "I am having my procedure done next week so I have not been able to take my motrin on top of working in the chair for full 4 days." Pt reports he is wearing her brace at night and waking up stiff and swollen    she was compliant with home exercise program given last session.   Response to previous treatment: increased pain and swelling   Functional change: none    Pain: 7/10 (pain level following tx a 4)  Location: left thumb     Objective       Nadine received the following supervised modalities after being cleared for contradictions for 10 minutes:   - fluidotherapy, continuous air, 110 degrees to increase blood flow, promote tissue extensibility, and decrease pain    Nadine received the following manual therapy techniques for 25 minutes:   - STM to surrounding thumb musculature (thenar eminence, hypothenar eminence, webspace)  - pressure release in webspace   - pin and stretch to promote tendon gliding near A1 pulley    Nadine received therapeutic exercises for 10 minutes including:  - wrist AROM " flex/ext x 20  (Not performed)  - wrist AROM RD/UD x 20 (Not performed)  - thumb AROM radial ABD/ADD and palmar ABD/ADD x 20   - flexor tendon glides through all positions x 10 (Not performed)  - IP jt blocking x 20 (therapist provided)  - digit abduction 1 min (Not performed)  - finger lifts 1 min  (Not performed)  - chinese balls CW (1 min) CCW (1 min)      Home Exercises and Education Provided     Education provided:   - monitor tape applied to L hand   - Progress towards goals     Written Home Exercises Provided: Patient instructed to cont prior HEP.  Exercises were reviewed and Nadine was able to demonstrate them prior to the end of the session.  Nadine demonstrated good  understanding of the education provided.   .   See EMR under Patient Instructions for exercises provided prior visit.     Assessment       Nadine is progressing well towards her goals and there are no updates to goals at this time. Displayed decreased edema and tightness within A1 pulley  Pt prognosis continues as Good. Pt will continue to benefit from skilled outpatient occupational therapy to address the deficits listed in the problem list on initial evaluation, provide pt/family education and to maximize pt's level of independence in the home and community environment.     Anticipated barriers to continued occupational therapy: work caseload     Pt's spiritual, cultural and educational needs considered and pt agreeable to plan of care and goals.    Goals     Goals:   The following goals were discussed with the patient and patient is in agreement with them as to be addressed in the treatment plan.   Long Term Goals (LTGs); to be met by discharge.  LTG #1: Pt will report a pain level of 1 out of 10 with functional grasping activities    LTG #2: Pt will demo improved FOTO score by 20 points.   LTG #3: Pt will return to prior level of function for ADLs and household management.      Short Term Goals (STGs); to be met within 4 weeks  (11/11/20).  STG #1a: Pt will report 4 out of 10 pain level with functional grasping activities.  Progressing   STG #2a: Pt will report/demo Modified [place] with using hair tools at work . Progressing   STG #3a: Pt will demonstrate independence with issued HEP. Progressing   STG #3b: Pt will demo improved wrist and hand DONOVAN by 10-20 degrees needed to aid with daily ADLs . Progressing       Plan     Continue skilled occupational therapy with individualized plan of care focusing on decreasing pain, increasing ROM, and improving tolerance for gripping activities required to complete daily activities of living       Updates/Grading for next session: increase reps with AROM    Carissa South, OTR/L

## 2020-12-04 ENCOUNTER — CLINICAL SUPPORT (OUTPATIENT)
Dept: REHABILITATION | Facility: HOSPITAL | Age: 50
End: 2020-12-04
Attending: PHYSICIAN ASSISTANT
Payer: COMMERCIAL

## 2020-12-04 DIAGNOSIS — M25.60 RANGE OF MOTION DEFICIT: ICD-10-CM

## 2020-12-04 DIAGNOSIS — M65.311 TRIGGER FINGER OF RIGHT THUMB: Primary | ICD-10-CM

## 2020-12-04 DIAGNOSIS — R29.898 THUMB WEAKNESS: ICD-10-CM

## 2020-12-04 DIAGNOSIS — M79.644 THUMB PAIN, RIGHT: ICD-10-CM

## 2020-12-04 PROCEDURE — 97110 THERAPEUTIC EXERCISES: CPT | Mod: PN

## 2020-12-04 PROCEDURE — 97140 MANUAL THERAPY 1/> REGIONS: CPT | Mod: PN

## 2020-12-04 NOTE — PROGRESS NOTES
"  Occupational Therapy Daily Treatment Note      Date: 12/4/2020  Name: Nadine Frances  Clinic Number: [id number]    Therapy Diagnosis:   Encounter Diagnoses   Name Primary?    Trigger finger of right thumb Yes    Thumb pain, right     Range of motion deficit     Thumb weakness      Physician: Sherlyn Mascorro PA  Physician Orders: Eval and treat, modalities as needed  Medical Diagnosis: M65.312 (ICD-10-CM) - Trigger finger of left thumb R20.0 (ICD-10-CM) - Finger numbness   Date of Injury/Onset: June  Evaluation Date: 11/12/2020  Insurance Authorization Period Expiration: 12/31/2020  Plan of Care Certification Period: 12/26/2020  Date of Return to MD: not yet scheduled      Visit #  Visits authorized: 4 / 20     FOTO: initial eval        Time In:2:45 PM  Time Out: 03:25 PM  Total treatment time: 40 minutes  Total Timed minutes:  40  minutes     Precautions:  Standard      Subjective     Pt reports: "I had my procedure yesterday. I am feeling fine though. I have not used my hand so I do not have any pain."    she was compliant with home exercise program given last session.   Response to previous treatment: decreased pain  Functional change: none    Pain: 1/10   Location: left thumb     Objective       Nadine received the following supervised modalities after being cleared for contradictions for 8 minutes:   - paraffin bath to promote increase blood flow, tissue extensibility, and decreased pain     Nadine received the following manual therapy techniques for 12 minutes:   - PROM MCP flexion and IP flexion x 20 each provided by therapist   - pin and stretch to promote tendon gliding near A1 pulley x 20   - webspace pressure point release    Nadine received therapeutic exercises for 20 minutes including:  - wrist AROM flex/ext x 20  (Not performed)  - wrist AROM RD/UD x 20 (Not performed)  - thumb AROM radial ABD/ADD and palmar ABD/ADD x 20 each   - thumb extension/flexion x 20   - IP jt blocking x " 20 (therapist provided)  - chinese balls CW (1 min) CCW (1 min)      Home Exercises and Education Provided     Education provided:   - wear trigger finger brace for the next week    - went over correct positioning for blocking exercises at home   - Progress towards goals     Written Home Exercises Provided: Patient instructed to cont prior HEP.  Exercises were reviewed and Nadine was able to demonstrate them prior to the end of the session.  Nadine demonstrated good  understanding of the education provided.   .   See EMR under Patient Instructions for exercises provided prior visit.     Assessment       Nadine is progressing well towards her goals and there are no updates to goals at this time. Displayed decreased edema and tightness within A1 pulley  Pt prognosis continues as Good. Pt will continue to benefit from skilled outpatient occupational therapy to address the deficits listed in the problem list on initial evaluation, provide pt/family education and to maximize pt's level of independence in the home and community environment.     Anticipated barriers to continued occupational therapy: work caseload     Pt's spiritual, cultural and educational needs considered and pt agreeable to plan of care and goals.    Goals     Goals:   The following goals were discussed with the patient and patient is in agreement with them as to be addressed in the treatment plan.   Long Term Goals (LTGs); to be met by discharge.  LTG #1: Pt will report a pain level of 1 out of 10 with functional grasping activities    LTG #2: Pt will demo improved FOTO score by 20 points.   LTG #3: Pt will return to prior level of function for ADLs and household management.      Short Term Goals (STGs); to be met within 4 weeks (11/11/20).  STG #1a: Pt will report 4 out of 10 pain level with functional grasping activities.  Progressing   STG #2a: Pt will report/demo Modified [place] with using hair tools at work . Progressing   STG #3a: Pt  will demonstrate independence with issued HEP. Progressing   STG #3b: Pt will demo improved wrist and hand DONOVAN by 10-20 degrees needed to aid with daily ADLs . Progressing       Plan     Continue skilled occupational therapy with individualized plan of care focusing on decreasing pain, increasing ROM, and improving tolerance for gripping activities required to complete daily activities of living       Updates/Grading for next session: increase reps with AROM    Carissa South OTR/LEANNE

## 2020-12-10 ENCOUNTER — CLINICAL SUPPORT (OUTPATIENT)
Dept: REHABILITATION | Facility: HOSPITAL | Age: 50
End: 2020-12-10
Attending: PHYSICIAN ASSISTANT
Payer: COMMERCIAL

## 2020-12-10 DIAGNOSIS — M65.311 TRIGGER FINGER OF RIGHT THUMB: Primary | ICD-10-CM

## 2020-12-10 DIAGNOSIS — M25.60 RANGE OF MOTION DEFICIT: ICD-10-CM

## 2020-12-10 DIAGNOSIS — R29.898 THUMB WEAKNESS: ICD-10-CM

## 2020-12-10 DIAGNOSIS — M79.644 THUMB PAIN, RIGHT: ICD-10-CM

## 2020-12-10 PROCEDURE — 97140 MANUAL THERAPY 1/> REGIONS: CPT | Mod: PN

## 2020-12-13 NOTE — PROGRESS NOTES
"  Occupational Therapy Daily Treatment Note      Date: 12/10/2020  Name: Nadine Frances  Clinic Number: [id number]    Therapy Diagnosis:   Encounter Diagnoses   Name Primary?    Trigger finger of right thumb Yes    Thumb pain, right     Range of motion deficit     Thumb weakness      Physician: Sherlyn Mascorro PA  Physician Orders: Eval and treat, modalities as needed  Medical Diagnosis: M65.312 (ICD-10-CM) - Trigger finger of left thumb R20.0 (ICD-10-CM) - Finger numbness   Date of Injury/Onset: June  Evaluation Date: 11/12/2020  Insurance Authorization Period Expiration: 12/31/2020  Plan of Care Certification Period: 12/26/2020  Date of Return to MD: not yet scheduled      Visit #  Visits authorized: 5 / 20     FOTO: initial eval        Time In:1:15 PM  Time Out: 1:45 PM  Total treatment time: 30 minutes  Total Timed minutes:  30  minutes     Precautions:  Standard      Subjective     Pt reports: "I wore my brace this past week. When I took it off I felt a little stiff."   she was compliant with home exercise program given last session.   Response to previous treatment: decreased pain and inflammation  Functional change: none    Pain: 1/10   Location: left thumb     Objective       Nadine received the following supervised modalities after being cleared for contradictions for 8 minutes:   - paraffin bath to promote increase blood flow, tissue extensibility, and decreased pain     Nadine received the following manual therapy techniques for 20 minutes:   - PROM MCP flexion and IP flexion x 20 each provided by therapist   - pin and stretch to promote tendon gliding near A1 pulley x 20   - webspace pressure point release    Nadine received therapeutic exercises for 3 minutes including:  - thumb extension/flexion x 20     (not performed today)  - IP jt blocking x 20 (therapist provided)  - chinese balls CW (1 min) CCW (1 min)  - thumb AROM radial ABD/ADD and palmar ABD/ADD x 20 each       Home " Exercises and Education Provided     Education provided:   - wear trigger finger brace for the next week, heat application and massage   - went over correct positioning for blocking exercises at home   - Progress towards goals     Written Home Exercises Provided: Patient instructed to cont prior HEP.  Exercises were reviewed and Nadine was able to demonstrate them prior to the end of the session.  Nadine demonstrated good  understanding of the education provided.   .   See EMR under Patient Instructions for exercises provided prior visit.     Assessment       Nadine is progressing well towards her goals and there are no updates to goals at this time. Displayed decreased edema and tightness within A1 pulley following paraffin and manual Pt prognosis continues as Good. Pt will continue to benefit from skilled outpatient occupational therapy to address the deficits listed in the problem list on initial evaluation, provide pt/family education and to maximize pt's level of independence in the home and community environment.     Anticipated barriers to continued occupational therapy: work caseload     Pt's spiritual, cultural and educational needs considered and pt agreeable to plan of care and goals.    Goals     Goals:   The following goals were discussed with the patient and patient is in agreement with them as to be addressed in the treatment plan.   Long Term Goals (LTGs); to be met by discharge.  LTG #1: Pt will report a pain level of 1 out of 10 with functional grasping activities    LTG #2: Pt will demo improved FOTO score by 20 points.   LTG #3: Pt will return to prior level of function for ADLs and household management.      Short Term Goals (STGs); to be met within 4 weeks (11/11/20).  STG #1a: Pt will report 4 out of 10 pain level with functional grasping activities.  Progressing   STG #2a: Pt will report/demo Modified [place] with using hair tools at work . Progressing   STG #3a: Pt will  demonstrate independence with issued HEP. MET   STG #3b: Pt will demo improved wrist and hand DONOVAN by 10-20 degrees needed to aid with daily ADLs . Progressing       Plan     Continue skilled occupational therapy with individualized plan of care focusing on decreasing pain, increasing ROM, and improving tolerance for gripping activities required to complete daily activities of living       Updates/Grading for next session: increase reps with AROM    Carissa South OTR/LEANNE

## 2020-12-16 ENCOUNTER — OFFICE VISIT (OUTPATIENT)
Dept: ORTHOPEDICS | Facility: CLINIC | Age: 50
End: 2020-12-16
Payer: COMMERCIAL

## 2020-12-16 VITALS
HEART RATE: 78 BPM | DIASTOLIC BLOOD PRESSURE: 78 MMHG | SYSTOLIC BLOOD PRESSURE: 122 MMHG | BODY MASS INDEX: 24.99 KG/M2 | WEIGHT: 150 LBS | HEIGHT: 65 IN

## 2020-12-16 DIAGNOSIS — R20.0 FINGER NUMBNESS: Primary | ICD-10-CM

## 2020-12-16 DIAGNOSIS — M65.312 TRIGGER FINGER OF LEFT THUMB: ICD-10-CM

## 2020-12-16 PROCEDURE — 3008F BODY MASS INDEX DOCD: CPT | Mod: CPTII,S$GLB,, | Performed by: PHYSICIAN ASSISTANT

## 2020-12-16 PROCEDURE — 3008F PR BODY MASS INDEX (BMI) DOCUMENTED: ICD-10-PCS | Mod: CPTII,S$GLB,, | Performed by: PHYSICIAN ASSISTANT

## 2020-12-16 PROCEDURE — 1125F PR PAIN SEVERITY QUANTIFIED, PAIN PRESENT: ICD-10-PCS | Mod: S$GLB,,, | Performed by: PHYSICIAN ASSISTANT

## 2020-12-16 PROCEDURE — 99999 PR PBB SHADOW E&M-EST. PATIENT-LVL III: CPT | Mod: PBBFAC,,, | Performed by: PHYSICIAN ASSISTANT

## 2020-12-16 PROCEDURE — 1125F AMNT PAIN NOTED PAIN PRSNT: CPT | Mod: S$GLB,,, | Performed by: PHYSICIAN ASSISTANT

## 2020-12-16 PROCEDURE — 99999 PR PBB SHADOW E&M-EST. PATIENT-LVL III: ICD-10-PCS | Mod: PBBFAC,,, | Performed by: PHYSICIAN ASSISTANT

## 2020-12-16 PROCEDURE — 99214 OFFICE O/P EST MOD 30 MIN: CPT | Mod: S$GLB,,, | Performed by: PHYSICIAN ASSISTANT

## 2020-12-16 PROCEDURE — 99214 PR OFFICE/OUTPT VISIT, EST, LEVL IV, 30-39 MIN: ICD-10-PCS | Mod: S$GLB,,, | Performed by: PHYSICIAN ASSISTANT

## 2020-12-16 NOTE — PROGRESS NOTES
"Subjective:      Patient ID: Nadine Frances is a 50 y.o. female.    Chief Complaint: Pain of the Left Hand      HPI  Nadine Frances is a right hand dominant 50 y.o. female presenting today for follow up of left thumb triggering.  She has started occupational therapy and tries to use carpal tunnel or trigger thumb braces as tolerated.  She reports improvement while she is at OT, but she continues to have thumb pain and stiffness as well as finger numbness/tingling.  She underwent left thumb trigger finger injection at her prior two visits, reports some improvement including less triggering. She reports that the thumb feels "really stiff" and she has less motion at the IP joint. She has a history of right trigger thumb release 1/2018. Her pain in the left thumb began 6+ months ago, the finger tingling and numbness began 3+ months ago.  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  She has intermittent swelling of the hands in the morning and finger tingling at night.  She has tried ibuprofen, but recently had to stop due to having surgery.       Review of patient's allergies indicates:  No Known Allergies      Current Outpatient Medications   Medication Sig Dispense Refill    amLODIPine (NORVASC) 5 MG tablet Take 5 mg by mouth every evening.       ascorbic acid, vitamin C, (VITAMIN C) 1000 MG tablet Take 1,000 mg by mouth once daily.      aspirin (ECOTRIN) 81 MG EC tablet Take 81 mg by mouth every evening.       b complex vitamins tablet Take 1 tablet by mouth once daily.      cetirizine (ZYRTEC) 10 MG tablet Take 10 mg by mouth once daily.      hydroCHLOROthiazide (HYDRODIURIL) 12.5 MG Tab Take 12.5 mg by mouth once daily.      Lactobacillus rhamnosus GG (CULTURELLE) 10 billion cell capsule Take 1 capsule by mouth once daily.      magnesium 30 mg Tab Take by mouth every evening.    " "   potassium chloride (KLOR-CON) 10 MEQ TbSR Take 10 mEq by mouth once.      vitamin D 1000 units Tab Take 1,000 Units by mouth every evening.        No current facility-administered medications for this visit.        Past Medical History:   Diagnosis Date    Cancer     basal cell     Hypertension     LBBB (left bundle branch block)        Past Surgical History:   Procedure Laterality Date    BREAST SURGERY Bilateral     augmentation    HAND SURGERY           Review of Systems:  Review of Systems   Constitution: Negative for chills and fever.   Skin: Negative for rash and suspicious lesions.   Musculoskeletal:        See HPI   Neurological: Negative for dizziness, headaches, light-headedness, numbness and paresthesias.   Psychiatric/Behavioral: Negative for depression. The patient is not nervous/anxious.          OBJECTIVE:     PHYSICAL EXAM:  Height: 5' 5" (165.1 cm) Weight: 68 kg (150 lb)  Vitals:    12/16/20 0934   BP: 122/78   Pulse: 78   Weight: 68 kg (150 lb)   Height: 5' 5" (1.651 m)   PainSc:   5     General    Vitals reviewed.  Constitutional: She is oriented to person, place, and time. She appears well-developed and well-nourished.   HENT:   Head: Normocephalic and atraumatic.   Neck: Normal range of motion.   Cardiovascular: Normal rate.    Pulmonary/Chest: Effort normal. No respiratory distress.   Neurological: She is alert and oriented to person, place, and time.   Psychiatric: She has a normal mood and affect. Her behavior is normal. Judgment and thought content normal.             Musculoskeletal:  Well-healed surgical scar right palm.  No scars left hand.  She is tender to palpation over the left thumb A1 pulley, otherwise nontender.  Palpable thickening/nodule appreciated, no triggering today.  Motion tested in isolation - good flexion and extension at all joints left thumb.   Neurovascularly-good sensation and motor function capillary refill 2+ radial pulses.  Negative Tinel's bilaterally at " the carpal tunnel, Guyon's canal, and cubital tunnel.  Positive Durkan's bilaterally.    RADIOGRAPHS:  Left hand XRay, 9/1/2020  FINDINGS:  Three views:  There is mild ulnar negative variance.  No fracture, dislocation, or bone destruction seen.  No trauma seen.    Comments: I have personally reviewed the imaging and I agree with the above radiologist's report.    ASSESSMENT/PLAN:   Nadine was seen today for pain.    Diagnoses and all orders for this visit:    Finger numbness  -     EMG W/ ULTRASOUND AND NERVE CONDUCTION TEST 2 Extremities; Future    Trigger finger of left thumb        - discussed patient's symptoms and physical exam findings, discussed trigger finger and carpal tunnel/nerve compression that are not improving.  Discussed conservative and surgical treatment options.    - EMG ordered and scheduled  - discussed use of carpal tunnel braces/trigger finger brace as tolerated  - ice and bracing as needed  - Tylenol or NSAIDs as needed  - Regular OT and HEP  - follow-up after EMG  - call with questions or concerns        Disclaimer: This note has been generated using voice-recognition software. There may be typographical errors that have been missed during proof-reading.

## 2020-12-17 ENCOUNTER — CLINICAL SUPPORT (OUTPATIENT)
Dept: REHABILITATION | Facility: HOSPITAL | Age: 50
End: 2020-12-17
Attending: PHYSICIAN ASSISTANT
Payer: COMMERCIAL

## 2020-12-17 DIAGNOSIS — R29.898 THUMB WEAKNESS: ICD-10-CM

## 2020-12-17 DIAGNOSIS — M65.311 TRIGGER FINGER OF RIGHT THUMB: Primary | ICD-10-CM

## 2020-12-17 DIAGNOSIS — M25.60 RANGE OF MOTION DEFICIT: ICD-10-CM

## 2020-12-17 DIAGNOSIS — M79.644 THUMB PAIN, RIGHT: ICD-10-CM

## 2020-12-17 PROCEDURE — 97140 MANUAL THERAPY 1/> REGIONS: CPT | Mod: PN

## 2020-12-17 PROCEDURE — 97035 APP MDLTY 1+ULTRASOUND EA 15: CPT | Mod: PN

## 2020-12-17 PROCEDURE — 97110 THERAPEUTIC EXERCISES: CPT | Mod: PN

## 2020-12-17 NOTE — PROGRESS NOTES
"  Occupational Therapy Daily Treatment Note      Date: 12/17/2020  Name: Nadine Frances  Clinic Number: [id number]    Therapy Diagnosis:   Encounter Diagnoses   Name Primary?    Trigger finger of right thumb Yes    Thumb pain, right     Range of motion deficit     Thumb weakness      Physician: Sherlyn Mascorro PA  Physician Orders: Eval and treat, modalities as needed  Medical Diagnosis: M65.312 (ICD-10-CM) - Trigger finger of left thumb R20.0 (ICD-10-CM) - Finger numbness   Date of Injury/Onset: June  Evaluation Date: 11/12/2020  Insurance Authorization Period Expiration: 12/31/2020  Plan of Care Certification Period: 12/26/2020  Date of Return to MD: not yet scheduled      Visit #  Visits authorized: 6 / 20     FOTO: initial eval        Time In:1:18 PM  Time Out: 2:00 PM  Total treatment time: 42 minutes  Total Timed minutes:  42 minutes     Precautions:  Standard      Subjective     Pt reports: "I saw my doctor and went ahead and scheduled the surgery. They did an EMG and said did have carpal tunnel    she was compliant with home exercise program given last session.   Response to previous treatment: decreased pain and inflammation  Functional change: none    Pain: 5/10   Location: left thumb     Objective       Nadine received the following direct contact modalities after being cleared for contradictions for 8 minutes:   - %, .9w/cm2 to promote tissue extensibility, increased blood flow, and decreased pain    Nadine received the following manual therapy techniques for 15 minutes:   - PROM MCP flexion and IP flexion x 20 each provided by therapist   - pin and stretch to promote tendon gliding near A1 pulley x 20   - webspace pressure point release  - KT tape circumferential for MCP joint      Nadine received therapeutic exercises for 10 minutes including:  - thumb extension/flexion x 20   - IP jt blocking x 20 therapist assisted  - wrist extension stretch 30s hold x 3     (not performed " today)  - chinese balls CW (1 min) CCW (1 min)  - thumb AROM radial ABD/ADD and palmar ABD/ADD x 20 each       Home Exercises and Education Provided     Education provided:     - went over correct positioning for blocking exercises at home   - Progress towards goals     Written Home Exercises Provided: Patient instructed to cont prior HEP.  Exercises were reviewed and Nadine was able to demonstrate them prior to the end of the session.  Nadine demonstrated good  understanding of the education provided.   .   See EMR under Patient Instructions for exercises provided prior visit.     Assessment       Nadine is progressing well towards her goals and there are no updates to goals at this time. Pt displayed decreased tightness and pain in thumb MCP joint following manual and US. Pt instructed to cont HEP prior to surgery to help with outcome. Pt prognosis continues as Good. Pt will continue to benefit from skilled outpatient occupational therapy to address the deficits listed in the problem list on initial evaluation, provide pt/family education and to maximize pt's level of independence in the home and community environment.     Anticipated barriers to continued occupational therapy: work caseload     Pt's spiritual, cultural and educational needs considered and pt agreeable to plan of care and goals.    Goals     Goals:   The following goals were discussed with the patient and patient is in agreement with them as to be addressed in the treatment plan.   Long Term Goals (LTGs); to be met by discharge.  LTG #1: Pt will report a pain level of 1 out of 10 with functional grasping activities    LTG #2: Pt will demo improved FOTO score by 20 points.   LTG #3: Pt will return to prior level of function for ADLs and household management.      Short Term Goals (STGs); to be met within 4 weeks (11/11/20).  STG #1a: Pt will report 4 out of 10 pain level with functional grasping activities.  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 Progressing   STG #3a: Pt will demonstrate independence with issued HEP. MET   STG #3b: Pt will demo improved wrist and hand DONOVAN by 10-20 degrees needed to aid with daily ADLs . Progressing       Plan     Continue skilled occupational therapy with individualized plan of care focusing on decreasing pain, increasing ROM, and improving tolerance for gripping activities required to complete daily activities of living       Updates/Grading for next session: increase reps with AROM    Carissa South OTR/L

## 2020-12-21 ENCOUNTER — CLINICAL SUPPORT (OUTPATIENT)
Dept: REHABILITATION | Facility: HOSPITAL | Age: 50
End: 2020-12-21
Attending: PHYSICIAN ASSISTANT
Payer: COMMERCIAL

## 2020-12-21 DIAGNOSIS — M79.644 THUMB PAIN, RIGHT: ICD-10-CM

## 2020-12-21 DIAGNOSIS — R29.898 THUMB WEAKNESS: ICD-10-CM

## 2020-12-21 DIAGNOSIS — M65.311 TRIGGER FINGER OF RIGHT THUMB: Primary | ICD-10-CM

## 2020-12-21 DIAGNOSIS — M25.60 RANGE OF MOTION DEFICIT: ICD-10-CM

## 2020-12-21 PROCEDURE — 97110 THERAPEUTIC EXERCISES: CPT | Mod: PN

## 2020-12-21 PROCEDURE — 97035 APP MDLTY 1+ULTRASOUND EA 15: CPT | Mod: PN

## 2020-12-21 PROCEDURE — 97140 MANUAL THERAPY 1/> REGIONS: CPT | Mod: PN

## 2020-12-21 PROCEDURE — 97022 WHIRLPOOL THERAPY: CPT | Mod: PN

## 2020-12-21 NOTE — PROGRESS NOTES
"  Occupational Therapy Daily Treatment Note      Date: 12/21/2020  Name: Nadine Frances  Clinic Number: [id number]    Therapy Diagnosis:   Encounter Diagnoses   Name Primary?    Trigger finger of right thumb Yes    Thumb pain, right     Range of motion deficit     Thumb weakness      Physician: Sherlyn Mascorro PA  Physician Orders: Eval and treat, modalities as needed  Medical Diagnosis: M65.312 (ICD-10-CM) - Trigger finger of left thumb R20.0 (ICD-10-CM) - Finger numbness   Date of Injury/Onset: June  Evaluation Date: 11/12/2020  Insurance Authorization Period Expiration: 12/31/2020  Plan of Care Certification Period: 12/26/2020  Date of Return to MD: not yet scheduled      Visit #  Visits authorized: 6 / 20     FOTO: initial eval        Time In:4:30 PM  Time Out: 5:15 PM  Total treatment time: 45 minutes  Total Timed minutes:  45 minutes     Precautions:  Standard      Subjective     Pt reports: "I worked yesterday behind the chair. I am little inflammed."    she was compliant with home exercise program given last session.   Response to previous treatment: decreased pain and inflammation  Functional change: none    Pain: 5/10   Location: left thumb     Objective       Nadine received the following direct contact modalities after being cleared for contradictions for 8 minutes:   - US 50%, .9w/cm2 to promote tissue extensibility, increased blood flow, and decreased pain    Nadine received the following manual therapy techniques for 20 minutes:   - STM using Hawk's  to anterior forearm to promote increased elasticity within digits   - pin and stretch to promote tendon gliding near A1 pulley x 10  - webspace pressure point release      Nadine received therapeutic exercises for 15 minutes including:  - thumb extension x 20   - IP jt blocking x 10 therapist assisted  - wrist extension stretch 30s hold x 3   - radial ABD/ palmar ABD x 10 each       Home Exercises and Education Provided "     Education provided:     - went over correct positioning for blocking exercises at home   - Progress towards goals     Written Home Exercises Provided: Patient instructed to cont prior HEP.  Exercises were reviewed and Nadine was able to demonstrate them prior to the end of the session.  Nadine demonstrated good  understanding of the education provided.   .   See EMR under Patient Instructions for exercises provided prior visit.     Assessment       Nadine is progressing well towards her goals and there are no updates to goals at this time. Pt displayed decreased tightness and pain in thumb MCP joint following manual and US. Decreased tightness within nodule on A1 pulley.   . Pt prognosis continues as Good. Pt will continue to benefit from skilled outpatient occupational therapy to address the deficits listed in the problem list on initial evaluation, provide pt/family education and to maximize pt's level of independence in the home and community environment.     Anticipated barriers to continued occupational therapy: work caseload     Pt's spiritual, cultural and educational needs considered and pt agreeable to plan of care and goals.    Goals     Goals:   The following goals were discussed with the patient and patient is in agreement with them as to be addressed in the treatment plan.   Long Term Goals (LTGs); to be met by discharge.  LTG #1: Pt will report a pain level of 1 out of 10 with functional grasping activities    LTG #2: Pt will demo improved FOTO score by 20 points.   LTG #3: Pt will return to prior level of function for ADLs and household management.      Short Term Goals (STGs); to be met within 4 weeks (11/11/20).  STG #1a: Pt will report 4 out of 10 pain level with functional grasping activities.  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 Progressing   STG #3a: Pt will demonstrate independence with issued HEP. MET   STG #3b: Pt will  demo improved wrist and hand DONOVAN by 10-20 degrees needed to aid with daily ADLs . Progressing       Plan     Continue skilled occupational therapy with individualized plan of care focusing on decreasing pain, increasing ROM, and improving tolerance for gripping activities required to complete daily activities of living       Updates/Grading for next session: strengthening     Carissa South, OTR/L

## 2021-01-19 ENCOUNTER — PROCEDURE VISIT (OUTPATIENT)
Dept: NEUROLOGY | Facility: CLINIC | Age: 51
End: 2021-01-19
Payer: COMMERCIAL

## 2021-01-19 DIAGNOSIS — R20.0 FINGER NUMBNESS: ICD-10-CM

## 2021-01-19 PROCEDURE — 95913 NRV CNDJ TEST 13/> STUDIES: CPT | Mod: S$GLB,,, | Performed by: PSYCHIATRY & NEUROLOGY

## 2021-01-19 PROCEDURE — 95886 PR EMG COMPLETE, W/ NERVE CONDUCTION STUDIES, 5+ MUSCLES: ICD-10-PCS | Mod: S$GLB,,, | Performed by: PSYCHIATRY & NEUROLOGY

## 2021-01-19 PROCEDURE — 95886 MUSC TEST DONE W/N TEST COMP: CPT | Mod: S$GLB,,, | Performed by: PSYCHIATRY & NEUROLOGY

## 2021-01-19 PROCEDURE — 95913 PR NERVE CONDUCTION STUDY; 13 OR MORE STUDIES: ICD-10-PCS | Mod: S$GLB,,, | Performed by: PSYCHIATRY & NEUROLOGY

## 2021-01-26 ENCOUNTER — OFFICE VISIT (OUTPATIENT)
Dept: ORTHOPEDICS | Facility: CLINIC | Age: 51
End: 2021-01-26
Payer: COMMERCIAL

## 2021-01-26 VITALS
WEIGHT: 150 LBS | HEART RATE: 84 BPM | SYSTOLIC BLOOD PRESSURE: 134 MMHG | HEIGHT: 65 IN | BODY MASS INDEX: 24.99 KG/M2 | DIASTOLIC BLOOD PRESSURE: 81 MMHG

## 2021-01-26 DIAGNOSIS — Z01.818 PREOP TESTING: Primary | ICD-10-CM

## 2021-01-26 DIAGNOSIS — M65.312 TRIGGER FINGER OF LEFT THUMB: Primary | ICD-10-CM

## 2021-01-26 DIAGNOSIS — G56.03 BILATERAL CARPAL TUNNEL SYNDROME: ICD-10-CM

## 2021-01-26 PROCEDURE — 3008F PR BODY MASS INDEX (BMI) DOCUMENTED: ICD-10-PCS | Mod: CPTII,S$GLB,, | Performed by: PHYSICIAN ASSISTANT

## 2021-01-26 PROCEDURE — 3008F BODY MASS INDEX DOCD: CPT | Mod: CPTII,S$GLB,, | Performed by: PHYSICIAN ASSISTANT

## 2021-01-26 PROCEDURE — 99999 PR PBB SHADOW E&M-EST. PATIENT-LVL III: CPT | Mod: PBBFAC,,, | Performed by: PHYSICIAN ASSISTANT

## 2021-01-26 PROCEDURE — 1125F PR PAIN SEVERITY QUANTIFIED, PAIN PRESENT: ICD-10-PCS | Mod: S$GLB,,, | Performed by: PHYSICIAN ASSISTANT

## 2021-01-26 PROCEDURE — 99999 PR PBB SHADOW E&M-EST. PATIENT-LVL III: ICD-10-PCS | Mod: PBBFAC,,, | Performed by: PHYSICIAN ASSISTANT

## 2021-01-26 PROCEDURE — 1125F AMNT PAIN NOTED PAIN PRSNT: CPT | Mod: S$GLB,,, | Performed by: PHYSICIAN ASSISTANT

## 2021-01-26 PROCEDURE — 99214 PR OFFICE/OUTPT VISIT, EST, LEVL IV, 30-39 MIN: ICD-10-PCS | Mod: S$GLB,,, | Performed by: PHYSICIAN ASSISTANT

## 2021-01-26 PROCEDURE — 99214 OFFICE O/P EST MOD 30 MIN: CPT | Mod: S$GLB,,, | Performed by: PHYSICIAN ASSISTANT

## 2021-01-28 DIAGNOSIS — M65.312 TRIGGER FINGER OF LEFT THUMB: Primary | ICD-10-CM

## 2021-01-28 DIAGNOSIS — G56.03 BILATERAL CARPAL TUNNEL SYNDROME: ICD-10-CM

## 2021-01-29 PROBLEM — G56.03 BILATERAL CARPAL TUNNEL SYNDROME: Status: ACTIVE | Noted: 2021-01-29

## 2021-02-03 ENCOUNTER — TELEPHONE (OUTPATIENT)
Dept: ORTHOPEDICS | Facility: CLINIC | Age: 51
End: 2021-02-03

## 2021-02-04 DIAGNOSIS — Z98.890 POST-OPERATIVE STATE: Primary | ICD-10-CM

## 2021-02-04 RX ORDER — TRAMADOL HYDROCHLORIDE 50 MG/1
50 TABLET ORAL EVERY 6 HOURS PRN
Qty: 10 TABLET | Refills: 0 | Status: SHIPPED | OUTPATIENT
Start: 2021-02-04

## 2021-02-07 ENCOUNTER — LAB VISIT (OUTPATIENT)
Dept: SPORTS MEDICINE | Facility: CLINIC | Age: 51
End: 2021-02-07
Payer: COMMERCIAL

## 2021-02-07 DIAGNOSIS — Z01.818 PREOP TESTING: ICD-10-CM

## 2021-02-07 LAB — SARS-COV-2 RNA RESP QL NAA+PROBE: NOT DETECTED

## 2021-02-07 PROCEDURE — U0005 INFEC AGEN DETEC AMPLI PROBE: HCPCS

## 2021-02-07 PROCEDURE — U0003 INFECTIOUS AGENT DETECTION BY NUCLEIC ACID (DNA OR RNA); SEVERE ACUTE RESPIRATORY SYNDROME CORONAVIRUS 2 (SARS-COV-2) (CORONAVIRUS DISEASE [COVID-19]), AMPLIFIED PROBE TECHNIQUE, MAKING USE OF HIGH THROUGHPUT TECHNOLOGIES AS DESCRIBED BY CMS-2020-01-R: HCPCS

## 2021-02-09 ENCOUNTER — TELEPHONE (OUTPATIENT)
Dept: ORTHOPEDICS | Facility: CLINIC | Age: 51
End: 2021-02-09

## 2021-02-09 RX ORDER — MUPIROCIN 20 MG/G
OINTMENT TOPICAL
Status: CANCELLED | OUTPATIENT
Start: 2021-02-09

## 2021-02-09 RX ORDER — HYDROCODONE BITARTRATE AND ACETAMINOPHEN 5; 325 MG/1; MG/1
1 TABLET ORAL EVERY 4 HOURS PRN
Status: CANCELLED | OUTPATIENT
Start: 2021-02-09

## 2021-02-09 RX ORDER — MUPIROCIN 20 MG/G
OINTMENT TOPICAL 2 TIMES DAILY
Status: CANCELLED | OUTPATIENT
Start: 2021-02-09 | End: 2021-02-14

## 2021-02-09 RX ORDER — SODIUM CHLORIDE 0.9 % (FLUSH) 0.9 %
10 SYRINGE (ML) INJECTION
Status: CANCELLED | OUTPATIENT
Start: 2021-02-09

## 2021-02-10 ENCOUNTER — ANESTHESIA (OUTPATIENT)
Dept: SURGERY | Facility: OTHER | Age: 51
End: 2021-02-10
Payer: COMMERCIAL

## 2021-02-10 ENCOUNTER — ANESTHESIA EVENT (OUTPATIENT)
Dept: SURGERY | Facility: OTHER | Age: 51
End: 2021-02-10
Payer: COMMERCIAL

## 2021-02-10 ENCOUNTER — HOSPITAL ENCOUNTER (OUTPATIENT)
Facility: OTHER | Age: 51
Discharge: HOME | End: 2021-02-10
Attending: ORTHOPAEDIC SURGERY | Admitting: ORTHOPAEDIC SURGERY
Payer: COMMERCIAL

## 2021-02-10 VITALS
DIASTOLIC BLOOD PRESSURE: 82 MMHG | RESPIRATION RATE: 16 BRPM | OXYGEN SATURATION: 99 % | BODY MASS INDEX: 24.99 KG/M2 | TEMPERATURE: 98 F | HEIGHT: 65 IN | SYSTOLIC BLOOD PRESSURE: 129 MMHG | WEIGHT: 150 LBS | HEART RATE: 64 BPM

## 2021-02-10 DIAGNOSIS — G56.00 CARPAL TUNNEL SYNDROME: Primary | ICD-10-CM

## 2021-02-10 LAB
B-HCG UR QL: NEGATIVE
CTP QC/QA: YES

## 2021-02-10 PROCEDURE — 37000008 HC ANESTHESIA 1ST 15 MINUTES: Performed by: ORTHOPAEDIC SURGERY

## 2021-02-10 PROCEDURE — 63600175 PHARM REV CODE 636 W HCPCS: Performed by: STUDENT IN AN ORGANIZED HEALTH CARE EDUCATION/TRAINING PROGRAM

## 2021-02-10 PROCEDURE — 63600175 PHARM REV CODE 636 W HCPCS: Performed by: ORTHOPAEDIC SURGERY

## 2021-02-10 PROCEDURE — 71000015 HC POSTOP RECOV 1ST HR: Performed by: ORTHOPAEDIC SURGERY

## 2021-02-10 PROCEDURE — 26055 PR INCISE FINGER TENDON SHEATH: ICD-10-PCS | Mod: 51,FA,, | Performed by: ORTHOPAEDIC SURGERY

## 2021-02-10 PROCEDURE — 20526 PR INJECT CARPAL TUNNEL: ICD-10-PCS | Mod: 59,51,RT, | Performed by: ORTHOPAEDIC SURGERY

## 2021-02-10 PROCEDURE — 63600175 PHARM REV CODE 636 W HCPCS: Performed by: NURSE ANESTHETIST, CERTIFIED REGISTERED

## 2021-02-10 PROCEDURE — 71000016 HC POSTOP RECOV ADDL HR: Performed by: ORTHOPAEDIC SURGERY

## 2021-02-10 PROCEDURE — 36000707: Performed by: ORTHOPAEDIC SURGERY

## 2021-02-10 PROCEDURE — 81025 URINE PREGNANCY TEST: CPT | Performed by: ORTHOPAEDIC SURGERY

## 2021-02-10 PROCEDURE — 20526 THER INJECTION CARP TUNNEL: CPT | Mod: 59,51,RT, | Performed by: ORTHOPAEDIC SURGERY

## 2021-02-10 PROCEDURE — 26055 INCISE FINGER TENDON SHEATH: CPT | Mod: 51,FA,, | Performed by: ORTHOPAEDIC SURGERY

## 2021-02-10 PROCEDURE — 64721 PR REVISE MEDIAN N/CARPAL TUNNEL SURG: ICD-10-PCS | Mod: LT,,, | Performed by: ORTHOPAEDIC SURGERY

## 2021-02-10 PROCEDURE — 36000706: Performed by: ORTHOPAEDIC SURGERY

## 2021-02-10 PROCEDURE — 25000003 PHARM REV CODE 250: Performed by: NURSE ANESTHETIST, CERTIFIED REGISTERED

## 2021-02-10 PROCEDURE — 25000003 PHARM REV CODE 250: Performed by: ORTHOPAEDIC SURGERY

## 2021-02-10 PROCEDURE — 64721 CARPAL TUNNEL SURGERY: CPT | Mod: LT,,, | Performed by: ORTHOPAEDIC SURGERY

## 2021-02-10 PROCEDURE — 37000009 HC ANESTHESIA EA ADD 15 MINS: Performed by: ORTHOPAEDIC SURGERY

## 2021-02-10 RX ORDER — LIDOCAINE HYDROCHLORIDE 10 MG/ML
INJECTION, SOLUTION EPIDURAL; INFILTRATION; INTRACAUDAL; PERINEURAL
Status: DISCONTINUED | OUTPATIENT
Start: 2021-02-10 | End: 2021-02-10 | Stop reason: HOSPADM

## 2021-02-10 RX ORDER — PROPOFOL 10 MG/ML
VIAL (ML) INTRAVENOUS CONTINUOUS PRN
Status: DISCONTINUED | OUTPATIENT
Start: 2021-02-10 | End: 2021-02-10

## 2021-02-10 RX ORDER — MUPIROCIN 20 MG/G
OINTMENT TOPICAL 2 TIMES DAILY
Status: CANCELLED | OUTPATIENT
Start: 2021-02-10 | End: 2021-02-15

## 2021-02-10 RX ORDER — BACITRACIN ZINC 500 UNIT/G
OINTMENT (GRAM) TOPICAL
Status: DISCONTINUED | OUTPATIENT
Start: 2021-02-10 | End: 2021-02-10 | Stop reason: HOSPADM

## 2021-02-10 RX ORDER — DEXAMETHASONE SODIUM PHOSPHATE 4 MG/ML
INJECTION, SOLUTION INTRA-ARTICULAR; INTRALESIONAL; INTRAMUSCULAR; INTRAVENOUS; SOFT TISSUE
Status: DISCONTINUED | OUTPATIENT
Start: 2021-02-10 | End: 2021-02-10 | Stop reason: HOSPADM

## 2021-02-10 RX ORDER — BUPIVACAINE HYDROCHLORIDE 2.5 MG/ML
INJECTION, SOLUTION EPIDURAL; INFILTRATION; INTRACAUDAL
Status: DISCONTINUED | OUTPATIENT
Start: 2021-02-10 | End: 2021-02-10 | Stop reason: HOSPADM

## 2021-02-10 RX ORDER — PROPOFOL 10 MG/ML
VIAL (ML) INTRAVENOUS
Status: DISCONTINUED | OUTPATIENT
Start: 2021-02-10 | End: 2021-02-10

## 2021-02-10 RX ORDER — SODIUM CHLORIDE 0.9 % (FLUSH) 0.9 %
10 SYRINGE (ML) INJECTION
Status: DISCONTINUED | OUTPATIENT
Start: 2021-02-10 | End: 2021-02-10 | Stop reason: HOSPADM

## 2021-02-10 RX ORDER — LIDOCAINE HYDROCHLORIDE 20 MG/ML
INJECTION INTRAVENOUS
Status: DISCONTINUED | OUTPATIENT
Start: 2021-02-10 | End: 2021-02-10

## 2021-02-10 RX ORDER — CEFAZOLIN SODIUM 1 G/3ML
2 INJECTION, POWDER, FOR SOLUTION INTRAMUSCULAR; INTRAVENOUS
Status: COMPLETED | OUTPATIENT
Start: 2021-02-10 | End: 2021-02-10

## 2021-02-10 RX ORDER — HYDROCODONE BITARTRATE AND ACETAMINOPHEN 5; 325 MG/1; MG/1
1 TABLET ORAL EVERY 4 HOURS PRN
Status: CANCELLED | OUTPATIENT
Start: 2021-02-10

## 2021-02-10 RX ADMIN — CEFAZOLIN 2 G: 330 INJECTION, POWDER, FOR SOLUTION INTRAMUSCULAR; INTRAVENOUS at 08:02

## 2021-02-10 RX ADMIN — PROPOFOL 100 MG: 10 INJECTION, EMULSION INTRAVENOUS at 08:02

## 2021-02-10 RX ADMIN — PROPOFOL 100 MCG/KG/MIN: 10 INJECTION, EMULSION INTRAVENOUS at 08:02

## 2021-02-10 RX ADMIN — LIDOCAINE HYDROCHLORIDE 20 MG: 20 INJECTION, SOLUTION INTRAVENOUS at 08:02

## 2021-02-25 ENCOUNTER — OFFICE VISIT (OUTPATIENT)
Dept: ORTHOPEDICS | Facility: CLINIC | Age: 51
End: 2021-02-25
Payer: COMMERCIAL

## 2021-02-25 VITALS
DIASTOLIC BLOOD PRESSURE: 86 MMHG | SYSTOLIC BLOOD PRESSURE: 139 MMHG | BODY MASS INDEX: 24.98 KG/M2 | HEART RATE: 76 BPM | WEIGHT: 149.94 LBS | HEIGHT: 65 IN

## 2021-02-25 DIAGNOSIS — Z47.89 ORTHOPEDIC AFTERCARE: ICD-10-CM

## 2021-02-25 DIAGNOSIS — M65.312 TRIGGER FINGER OF LEFT THUMB: ICD-10-CM

## 2021-02-25 DIAGNOSIS — G56.03 BILATERAL CARPAL TUNNEL SYNDROME: Primary | ICD-10-CM

## 2021-02-25 PROCEDURE — 99024 POSTOP FOLLOW-UP VISIT: CPT | Mod: S$GLB,,, | Performed by: PHYSICIAN ASSISTANT

## 2021-02-25 PROCEDURE — 3008F PR BODY MASS INDEX (BMI) DOCUMENTED: ICD-10-PCS | Mod: CPTII,S$GLB,, | Performed by: PHYSICIAN ASSISTANT

## 2021-02-25 PROCEDURE — 1126F AMNT PAIN NOTED NONE PRSNT: CPT | Mod: S$GLB,,, | Performed by: PHYSICIAN ASSISTANT

## 2021-02-25 PROCEDURE — 99024 PR POST-OP FOLLOW-UP VISIT: ICD-10-PCS | Mod: S$GLB,,, | Performed by: PHYSICIAN ASSISTANT

## 2021-02-25 PROCEDURE — 3008F BODY MASS INDEX DOCD: CPT | Mod: CPTII,S$GLB,, | Performed by: PHYSICIAN ASSISTANT

## 2021-02-25 PROCEDURE — 99999 PR PBB SHADOW E&M-EST. PATIENT-LVL III: CPT | Mod: PBBFAC,,, | Performed by: PHYSICIAN ASSISTANT

## 2021-02-25 PROCEDURE — 1126F PR PAIN SEVERITY QUANTIFIED, NO PAIN PRESENT: ICD-10-PCS | Mod: S$GLB,,, | Performed by: PHYSICIAN ASSISTANT

## 2021-02-25 PROCEDURE — 99999 PR PBB SHADOW E&M-EST. PATIENT-LVL III: ICD-10-PCS | Mod: PBBFAC,,, | Performed by: PHYSICIAN ASSISTANT

## 2021-08-26 ENCOUNTER — IMMUNIZATION (OUTPATIENT)
Dept: PRIMARY CARE CLINIC | Facility: CLINIC | Age: 51
End: 2021-08-26
Payer: COMMERCIAL

## 2021-08-26 DIAGNOSIS — Z23 NEED FOR VACCINATION: Primary | ICD-10-CM

## 2021-08-26 PROCEDURE — 91303 COVID-19,VECTOR-NR,RS-AD26,PF,0.5 ML DOSE VACCINE (JANSSEN): CPT | Mod: S$GLB,,, | Performed by: INTERNAL MEDICINE

## 2021-08-26 PROCEDURE — 0031A COVID-19,VECTOR-NR,RS-AD26,PF,0.5 ML DOSE VACCINE (JANSSEN): CPT | Mod: CV19,S$GLB,, | Performed by: INTERNAL MEDICINE

## 2021-08-26 PROCEDURE — 91303 COVID-19,VECTOR-NR,RS-AD26,PF,0.5 ML DOSE VACCINE (JANSSEN): ICD-10-PCS | Mod: S$GLB,,, | Performed by: INTERNAL MEDICINE

## 2021-08-26 PROCEDURE — 0031A COVID-19,VECTOR-NR,RS-AD26,PF,0.5 ML DOSE VACCINE (JANSSEN): ICD-10-PCS | Mod: CV19,S$GLB,, | Performed by: INTERNAL MEDICINE

## 2024-05-01 DIAGNOSIS — M79.642 BILATERAL HAND PAIN: Primary | ICD-10-CM

## 2024-05-01 DIAGNOSIS — M79.641 BILATERAL HAND PAIN: Primary | ICD-10-CM

## 2024-05-15 ENCOUNTER — TELEPHONE (OUTPATIENT)
Dept: ORTHOPEDICS | Facility: CLINIC | Age: 54
End: 2024-05-15
Payer: COMMERCIAL

## 2024-05-21 ENCOUNTER — HOSPITAL ENCOUNTER (OUTPATIENT)
Dept: RADIOLOGY | Facility: OTHER | Age: 54
Discharge: HOME | End: 2024-05-21
Attending: ORTHOPAEDIC SURGERY
Payer: COMMERCIAL

## 2024-05-21 ENCOUNTER — OFFICE VISIT (OUTPATIENT)
Dept: ORTHOPEDICS | Facility: CLINIC | Age: 54
End: 2024-05-21
Payer: COMMERCIAL

## 2024-05-21 VITALS — HEIGHT: 65 IN | WEIGHT: 149.94 LBS | BODY MASS INDEX: 24.98 KG/M2

## 2024-05-21 DIAGNOSIS — M79.642 BILATERAL HAND PAIN: ICD-10-CM

## 2024-05-21 DIAGNOSIS — M65.341 TRIGGER FINGER, RIGHT RING FINGER: ICD-10-CM

## 2024-05-21 DIAGNOSIS — M67.432 GANGLION CYST OF DORSUM OF LEFT WRIST: Primary | ICD-10-CM

## 2024-05-21 DIAGNOSIS — M65.331 TRIGGER FINGER, RIGHT MIDDLE FINGER: ICD-10-CM

## 2024-05-21 DIAGNOSIS — M79.641 BILATERAL HAND PAIN: ICD-10-CM

## 2024-05-21 DIAGNOSIS — G56.01 CARPAL TUNNEL SYNDROME, RIGHT: ICD-10-CM

## 2024-05-21 PROCEDURE — 73130 X-RAY EXAM OF HAND: CPT | Mod: TC,50,FY

## 2024-05-21 PROCEDURE — 99999 PR PBB SHADOW E&M-EST. PATIENT-LVL III: CPT | Mod: PBBFAC,,, | Performed by: ORTHOPAEDIC SURGERY

## 2024-05-21 PROCEDURE — 73130 X-RAY EXAM OF HAND: CPT | Mod: 26,,, | Performed by: RADIOLOGY

## 2024-05-21 PROCEDURE — 1159F MED LIST DOCD IN RCRD: CPT | Mod: CPTII,S$GLB,, | Performed by: ORTHOPAEDIC SURGERY

## 2024-05-21 PROCEDURE — 99204 OFFICE O/P NEW MOD 45 MIN: CPT | Mod: S$GLB,,, | Performed by: ORTHOPAEDIC SURGERY

## 2024-05-21 PROCEDURE — 3008F BODY MASS INDEX DOCD: CPT | Mod: CPTII,S$GLB,, | Performed by: ORTHOPAEDIC SURGERY

## 2024-05-21 NOTE — PROGRESS NOTES
[facility]  History & Physical  Orthopedics    SUBJECTIVE:     Chief Complaint:  bilateral hand pain and left dorsal wrist cyst    Referring Provider: Self, Aaareferral     History of Present Illness:  Patient is a 54 y.o. right hand dominant female who presents today with complaints of  bilateral hand pain and left dorsal wrist cyst.  Reports   Bilateral ring and middle finger triggering with associated pain bilaterally which now has worsened over the past 2 years.  She reports concern about bilateral breast implants causing cyst formation in her hands.  She is scheduled for a biopsy her breast in the future. She reports having difficulty opening bottles and painful when writing.   She has a previous history of a right trigger thumb release in 2021  and also left carpal tunnel release which is not causing her any problems.   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  She denies any trauma.  The patient denies any fevers, chills, N/V, D/C and presents for evaluation.    Review of patient's allergies indicates:  No Known Allergies    Past Medical History:   Diagnosis Date    Cancer     basal cell     Hypertension     LBBB (left bundle branch block)      Past Surgical History:   Procedure Laterality Date    BREAST SURGERY Bilateral 12/06/2020    augmentationLUMPECTOMY    CARPAL TUNNEL RELEASE Left 2/10/2021    Procedure: RELEASE, CARPAL TUNNEL - LEFT;  Surgeon: Valentine Roberts MD;  Location: [facility] OR;  Service: Orthopedics;  Laterality: Left;    HAND SURGERY      INJECTION OF STEROID Right 2/10/2021    Procedure: INJECTION, STEROID - RIGHT CARPAL TUNNEL INJECTION;  Surgeon: Valentine Roberts MD;  Location: [facility] OR;  Service: Orthopedics;  Laterality: Right;    TRIGGER FINGER RELEASE Left 2/10/2021    Procedure: RELEASE, TRIGGER FINGER - LEFT THUMB;  Surgeon: Valentine Roberts MD;   Location: [facility];  Service: Orthopedics;  Laterality: Left;     No family history on file.  Social History     Tobacco Use    Smoking status: Never    Smokeless tobacco: Never   Substance Use Topics    Alcohol use: Yes     Comment: socially        Review of Systems:  Constitutional: Denies fever/chills  Neurological: Denies numbness/tingling (any exceptions noted in orthopaedic exam)   Psychiatric/Behavioral: Denies change in normal mood  Eyes: Denies change in vision  Cardiovascular: Denies chest pain  Respiratory: Denies shortness of breath  Hematologic/Lymphatic: Denies easy bleeding/bruising   Skin: Denies new rash or skin lesions   Gastrointestinal: Denies nausea/vomitting/diarrhea, change in bowel habits, abdominal pain   Allergic/Immunologic: Denies adverse reactions to current medications  Musculoskeletal: see HPI      OBJECTIVE:     Vital Signs (Most Recent)       Physical Exam:  Gen:  No acute distress  CV:  Peripherally well-perfused.  Pulses 2+ bilaterally.  Lungs:  Normal respiratory effort.  Abdomen:  Soft, non-tender, non-distended  Head/Neck:  Normocephalic.  Atraumatic. No TTP, AROM and PROM intact without pain  Neuro:  CN intact without deficit, SILT throughout B/L Upper & Lower Extremities    Bilateral Hand/Wrist Examination:    Observation/Inspection:  Swelling   Mild swelling on volar aspect of 3rd and 4th MCP,  left    Deformity  none  Discoloration  none     Scars   none    Atrophy  none  + TTP A1 pulley bilateral ring and middle finger. Palpable NAtas nodule on bilateral middle finger A1 pulley.  Palpable raised firm mass on left dorsum wrist at radiocarpal joint.   HAND/WRIST EXAMINATION:  Finkelstein's Test   Neg  WHAT Test    Neg  Snuff box tenderness   Neg  Rubio's Test    Neg  Hook of [place]ate Tenderness  Neg  CMC grind    Neg  Circumduction test   Ne    Neurovascular Exam:  Digits WWP, brisk CR < 3s throughout  NVI motor/LTS to M/R/U nerves, radial pulse 2+  Tinel's Test - Carpal  Tunnel   Positive right  Tinel's Test - Cubital Tunnel             Pos right   Phalen's Test    Neg   Positive Durkan's right      ROM hand full, painless    ROM wrist full, painless    ROM elbow full, painless    Abdomen not guarded  Respirations nonlabored  Perfusion intact    Diagnostic Results:      Bilateral hand x-ray 5/21/2024    FINDINGS:  No acute fracture or dislocation seen.     No significant osteophyte formation, suspicious osseous erosions, or joint space narrowing.     No significant soft tissue edema or radiopaque retained foreign body.     Impression:     No acute osseous abnormality seen.  Imaging - I independently viewed the patient's imaging as well as the radiology report.    EMG - none    ASSESSMENT/PLAN:     Nadine was seen today for pain, ganglion cyst, pain and ganglion cyst.    Diagnoses and all orders for this visit:    Ganglion cyst of dorsum of left wrist    Trigger finger, right ring finger    Trigger finger, right middle finger    Carpal tunnel syndrome, right         54 y.o. yo female with  bilateral trigger finger of the right  ringand middle finger  and left wrist dorsal ganglion cyst      Plan:   X-ray reviewed and discussed with patient.  Negative for any dislocations or fractures.  Based off HPI and exam today bilateral hand pain consistent with multiple digit trigger finger with palpable Natas nodule.   Pathophysiology was fully discussed with patient today.  Patient would like to proceed with surgery but would like to wait following her biopsy of her breast.   Left dorsal wrist mass consistent with ganglion cyst.  Discussed with patient pathophysiology of ganglion cyst which  she could  be managed with observation versus surgical intervention.   Surgical consent  signed in clinic today.   Patient will determine which extremity is worse and will let the clinic know.    For her right-hand : surgical consent for right carpal tunnel release and right ring and middle finger  release and the other consent was fot left ring and middle finger trigger finger release.

## 2024-05-21 NOTE — PROGRESS NOTES
Patient has trigger fingers long and ring bilaterally she also has carpal tunnel on the right she had undergone a left carpal tunnel release in the past and did well from that she has had the trigger fingers for quite some time now it is very bothersome to her she is tried resting but there continued to be painful and lock     On examination she has got not as nodules on the bilateral long and ring fingers a long finger more pronounced tenderness to palpation over A1 pulleys no subluxation of extensor tendons plan we discussed surgical release of A1 pulleys and when operating on the right hand will include a carpal tunnel release she does have nerve testing already and she is positive provocative examination patient is having a breast biopsy tomorrow she wants to wait on surgery for hand until that is completed consents signed today she will message us through [employer] when she is ready for surgery for hands

## 2024-05-30 ENCOUNTER — PATIENT MESSAGE (OUTPATIENT)
Dept: ORTHOPEDICS | Facility: CLINIC | Age: 54
End: 2024-05-30
Payer: COMMERCIAL

## 2024-06-06 DIAGNOSIS — M65.331 TRIGGER FINGER, RIGHT MIDDLE FINGER: ICD-10-CM

## 2024-06-06 DIAGNOSIS — M65.341 TRIGGER FINGER, RIGHT RING FINGER: Primary | ICD-10-CM

## 2024-06-17 ENCOUNTER — ANESTHESIA EVENT (OUTPATIENT)
Dept: SURGERY | Facility: HOSPITAL | Age: 54
End: 2024-06-17
Payer: COMMERCIAL

## 2024-06-26 RX ORDER — TRAMADOL HYDROCHLORIDE 50 MG/1
50 TABLET ORAL EVERY 6 HOURS PRN
Qty: 10 TABLET | Refills: 0 | Status: SHIPPED | OUTPATIENT
Start: 2024-06-26

## 2024-06-27 ENCOUNTER — TELEPHONE (OUTPATIENT)
Dept: ORTHOPEDICS | Facility: CLINIC | Age: 54
End: 2024-06-27
Payer: COMMERCIAL

## 2024-06-27 ENCOUNTER — PATIENT MESSAGE (OUTPATIENT)
Dept: ORTHOPEDICS | Facility: CLINIC | Age: 54
End: 2024-06-27
Payer: COMMERCIAL

## 2024-06-27 NOTE — TELEPHONE ENCOUNTER
Spoke c pt. Informed pt of 5: 30 a.m. arrival time for surgery at the [facility]. Reminded pt of NPO status & PO appt. Pt expressed understanding & was thankful.

## 2024-06-28 ENCOUNTER — ANESTHESIA (OUTPATIENT)
Dept: SURGERY | Facility: HOSPITAL | Age: 54
End: 2024-06-28
Payer: COMMERCIAL

## 2024-06-28 ENCOUNTER — HOSPITAL ENCOUNTER (OUTPATIENT)
Facility: HOSPITAL | Age: 54
Discharge: HOME | End: 2024-06-28
Attending: ORTHOPAEDIC SURGERY | Admitting: ORTHOPAEDIC SURGERY
Payer: COMMERCIAL

## 2024-06-28 VITALS
RESPIRATION RATE: 19 BRPM | DIASTOLIC BLOOD PRESSURE: 76 MMHG | WEIGHT: 140 LBS | HEIGHT: 65 IN | TEMPERATURE: 98 F | OXYGEN SATURATION: 98 % | HEART RATE: 61 BPM | SYSTOLIC BLOOD PRESSURE: 117 MMHG | BODY MASS INDEX: 23.32 KG/M2

## 2024-06-28 DIAGNOSIS — M65.341 TRIGGER FINGER, RIGHT RING FINGER: ICD-10-CM

## 2024-06-28 DIAGNOSIS — M65.331 TRIGGER FINGER, RIGHT MIDDLE FINGER: Primary | ICD-10-CM

## 2024-06-28 DIAGNOSIS — M65.342 TRIGGER FINGER, LEFT RING FINGER: ICD-10-CM

## 2024-06-28 LAB
B-HCG UR QL: NEGATIVE
CTP QC/QA: YES

## 2024-06-28 PROCEDURE — 36000707: Performed by: ORTHOPAEDIC SURGERY

## 2024-06-28 PROCEDURE — 71000015 HC POSTOP RECOV 1ST HR: Performed by: ORTHOPAEDIC SURGERY

## 2024-06-28 PROCEDURE — 63600175 PHARM REV CODE 636 W HCPCS

## 2024-06-28 PROCEDURE — 63600175 PHARM REV CODE 636 W HCPCS: Mod: JZ,JG | Performed by: ORTHOPAEDIC SURGERY

## 2024-06-28 PROCEDURE — 81025 URINE PREGNANCY TEST: CPT | Performed by: ORTHOPAEDIC SURGERY

## 2024-06-28 PROCEDURE — 25000003 PHARM REV CODE 250

## 2024-06-28 PROCEDURE — 25000003 PHARM REV CODE 250: Performed by: ORTHOPAEDIC SURGERY

## 2024-06-28 PROCEDURE — 36000706: Performed by: ORTHOPAEDIC SURGERY

## 2024-06-28 PROCEDURE — 26055 INCISE FINGER TENDON SHEATH: CPT | Mod: F7,,, | Performed by: ORTHOPAEDIC SURGERY

## 2024-06-28 PROCEDURE — 25000003 PHARM REV CODE 250: Performed by: NURSE PRACTITIONER

## 2024-06-28 PROCEDURE — 71000033 HC RECOVERY, INTIAL HOUR: Performed by: ORTHOPAEDIC SURGERY

## 2024-06-28 PROCEDURE — 99900035 HC TECH TIME PER 15 MIN (STAT)

## 2024-06-28 PROCEDURE — 37000008 HC ANESTHESIA 1ST 15 MINUTES: Performed by: ORTHOPAEDIC SURGERY

## 2024-06-28 PROCEDURE — 94761 N-INVAS EAR/PLS OXIMETRY MLT: CPT

## 2024-06-28 PROCEDURE — 25000003 PHARM REV CODE 250: Performed by: NURSE ANESTHETIST, CERTIFIED REGISTERED

## 2024-06-28 PROCEDURE — 37000009 HC ANESTHESIA EA ADD 15 MINS: Performed by: ORTHOPAEDIC SURGERY

## 2024-06-28 PROCEDURE — 63600175 PHARM REV CODE 636 W HCPCS: Performed by: NURSE ANESTHETIST, CERTIFIED REGISTERED

## 2024-06-28 RX ORDER — SODIUM CHLORIDE 0.9 % (FLUSH) 0.9 %
10 SYRINGE (ML) INJECTION
Status: DISCONTINUED | OUTPATIENT
Start: 2024-06-28 | End: 2024-06-28 | Stop reason: HOSPADM

## 2024-06-28 RX ORDER — SODIUM CHLORIDE 9 MG/ML
INJECTION, SOLUTION INTRAVENOUS CONTINUOUS PRN
Status: DISCONTINUED | OUTPATIENT
Start: 2024-06-28 | End: 2024-06-28

## 2024-06-28 RX ORDER — PROPOFOL 10 MG/ML
INJECTION, EMULSION INTRAVENOUS CONTINUOUS PRN
Status: DISCONTINUED | OUTPATIENT
Start: 2024-06-28 | End: 2024-06-28

## 2024-06-28 RX ORDER — ACETAMINOPHEN 500 MG
1000 TABLET ORAL
Status: COMPLETED | OUTPATIENT
Start: 2024-06-28 | End: 2024-06-28

## 2024-06-28 RX ORDER — ONDANSETRON HYDROCHLORIDE 2 MG/ML
4 INJECTION, SOLUTION INTRAVENOUS DAILY PRN
Status: DISCONTINUED | OUTPATIENT
Start: 2024-06-28 | End: 2024-06-28 | Stop reason: HOSPADM

## 2024-06-28 RX ORDER — ONDANSETRON HYDROCHLORIDE 2 MG/ML
INJECTION, SOLUTION INTRAVENOUS
Status: DISCONTINUED | OUTPATIENT
Start: 2024-06-28 | End: 2024-06-28

## 2024-06-28 RX ORDER — LIDOCAINE HYDROCHLORIDE 10 MG/ML
INJECTION, SOLUTION EPIDURAL; INFILTRATION; INTRACAUDAL; PERINEURAL
Status: DISCONTINUED | OUTPATIENT
Start: 2024-06-28 | End: 2024-06-28 | Stop reason: HOSPADM

## 2024-06-28 RX ORDER — SODIUM CHLORIDE 9 MG/ML
INJECTION, SOLUTION INTRAVENOUS CONTINUOUS
Status: DISCONTINUED | OUTPATIENT
Start: 2024-06-28 | End: 2024-06-28 | Stop reason: HOSPADM

## 2024-06-28 RX ORDER — HALOPERIDOL 5 MG/ML
0.5 INJECTION INTRAMUSCULAR EVERY 10 MIN PRN
Status: DISCONTINUED | OUTPATIENT
Start: 2024-06-28 | End: 2024-06-28 | Stop reason: HOSPADM

## 2024-06-28 RX ORDER — BUPIVACAINE HYDROCHLORIDE 2.5 MG/ML
INJECTION, SOLUTION EPIDURAL; INFILTRATION; INTRACAUDAL
Status: DISCONTINUED | OUTPATIENT
Start: 2024-06-28 | End: 2024-06-28 | Stop reason: HOSPADM

## 2024-06-28 RX ORDER — DEXMEDETOMIDINE HYDROCHLORIDE 100 UG/ML
INJECTION, SOLUTION INTRAVENOUS
Status: DISCONTINUED | OUTPATIENT
Start: 2024-06-28 | End: 2024-06-28

## 2024-06-28 RX ORDER — LIDOCAINE HYDROCHLORIDE 20 MG/ML
INJECTION INTRAVENOUS
Status: DISCONTINUED | OUTPATIENT
Start: 2024-06-28 | End: 2024-06-28

## 2024-06-28 RX ORDER — FENTANYL CITRATE 50 UG/ML
25 INJECTION, SOLUTION INTRAMUSCULAR; INTRAVENOUS EVERY 5 MIN PRN
Status: DISCONTINUED | OUTPATIENT
Start: 2024-06-28 | End: 2024-06-28 | Stop reason: HOSPADM

## 2024-06-28 RX ORDER — MIDAZOLAM HYDROCHLORIDE 1 MG/ML
INJECTION INTRAMUSCULAR; INTRAVENOUS
Status: DISCONTINUED | OUTPATIENT
Start: 2024-06-28 | End: 2024-06-28

## 2024-06-28 RX ORDER — FAMOTIDINE 10 MG/ML
INJECTION INTRAVENOUS
Status: DISCONTINUED | OUTPATIENT
Start: 2024-06-28 | End: 2024-06-28

## 2024-06-28 RX ORDER — BACITRACIN ZINC 500 UNIT/G
OINTMENT (GRAM) TOPICAL
Status: DISCONTINUED | OUTPATIENT
Start: 2024-06-28 | End: 2024-06-28 | Stop reason: HOSPADM

## 2024-06-28 RX ADMIN — DEXMEDETOMIDINE 4 MCG: 100 INJECTION, SOLUTION, CONCENTRATE INTRAVENOUS at 07:06

## 2024-06-28 RX ADMIN — CEFAZOLIN 2 G: 2 INJECTION, POWDER, FOR SOLUTION INTRAMUSCULAR; INTRAVENOUS at 07:06

## 2024-06-28 RX ADMIN — MIDAZOLAM HYDROCHLORIDE 2 MG: 1 INJECTION, SOLUTION INTRAMUSCULAR; INTRAVENOUS at 07:06

## 2024-06-28 RX ADMIN — ONDANSETRON 4 MG: 2 INJECTION INTRAMUSCULAR; INTRAVENOUS at 07:06

## 2024-06-28 RX ADMIN — LIDOCAINE HYDROCHLORIDE 60 MG: 20 INJECTION INTRAVENOUS at 07:06

## 2024-06-28 RX ADMIN — SODIUM CHLORIDE: 0.9 INJECTION, SOLUTION INTRAVENOUS at 06:06

## 2024-06-28 RX ADMIN — SODIUM CHLORIDE: 0.9 INJECTION, SOLUTION INTRAVENOUS at 07:06

## 2024-06-28 RX ADMIN — ACETAMINOPHEN 1000 MG: 500 TABLET ORAL at 06:06

## 2024-06-28 RX ADMIN — FAMOTIDINE 20 MG: 10 INJECTION, SOLUTION INTRAVENOUS at 07:06

## 2024-06-28 RX ADMIN — PROPOFOL 75 MCG/KG/MIN: 10 INJECTION, EMULSION INTRAVENOUS at 07:06

## 2024-06-28 NOTE — ANESTHESIA PREPROCEDURE EVALUATION
2024  Nadine Frances is a 54 y.o., female Pre-operative evaluation for Procedure(s) (LRB):  LEFT RING AND MIDDLE RELEASE, TRIGGER FINGER (Left)    Nadine Frances is a 54 y.o. female     Patient Active Problem List   Diagnosis    Trigger finger of right thumb    Thumb pain, right    Range of motion deficit    Thumb weakness    Bilateral carpal tunnel syndrome    Carpal tunnel syndrome       Review of patient's allergies indicates:  No Known Allergies    No current facility-administered medications on file prior to encounter.     Current Outpatient Medications on File Prior to Encounter   Medication Sig Dispense Refill    amLODIPine (NORVASC) 5 MG tablet Take 5 mg by mouth every evening.       vitamin D 1000 units Tab Take 1,000 Units by mouth every evening.       ascorbic acid, vitamin C, (VITAMIN C) 1000 MG tablet Take 1,000 mg by mouth once daily.      b complex vitamins tablet Take 1 tablet by mouth once daily.      cetirizine (ZYRTEC) 10 MG tablet Take 10 mg by mouth once daily.      Lactobacillus rhamnosus GG (CULTURELLE) 10 billion cell capsule Take 1 capsule by mouth once daily.      magnesium 30 mg Tab Take by mouth every evening.       montelukast sodium (SINGULAIR ORAL) Take 10 mg by mouth Daily.         Past Surgical History:   Procedure Laterality Date    AUGMENTATION OF BREAST Bilateral     BREAST SURGERY Bilateral 2020    augmentationLUMPECTOMY    CARPAL TUNNEL RELEASE Left 02/10/2021    Procedure: RELEASE, CARPAL TUNNEL - LEFT;  Surgeon: Valentine Roberts MD;  Location: [facility] OR;  Service: Orthopedics;  Laterality: Left;     SECTION      FOOT SURGERY Right     HAND SURGERY      INJECTION OF STEROID Right 02/10/2021    Procedure: INJECTION, STEROID - RIGHT CARPAL TUNNEL INJECTION;  Surgeon: Valentine Roberts MD;  Location: [facility] OR;  Service:  Orthopedics;  Laterality: Right;    TRIGGER FINGER RELEASE Left 02/10/2021    Procedure: RELEASE, TRIGGER FINGER - LEFT THUMB;  Surgeon: Valentine Roberts MD;  Location: [facility];  Service: Orthopedics;  Laterality: Left;       Pre-op Assessment    I have reviewed the Patient Summary Reports.     I have reviewed the Nursing Notes. I have reviewed the NPO Status.   I have reviewed the Medications.     Review of Systems  Anesthesia Hx:  No problems with previous Anesthesia   History of prior surgery of interest to airway management or planning:          Denies Family Hx of Anesthesia complications.    Denies Personal Hx of Anesthesia complications.                    Social:  Non-Smoker, Social Alcohol Use       Hematology/Oncology:  Hematology Normal   Oncology Normal                                   EENT/Dental:  chronic allergic rhinitis           Cardiovascular:  Exercise tolerance: good   Hypertension    Dysrhythmias                                    Pulmonary:  Pulmonary Normal                       Renal/:  Renal/ Normal                 Hepatic/GI:  Hepatic/GI Normal                 Neurological:    Neuromuscular Disease,                                   Endocrine:  Endocrine Normal            Psych:  Psychiatric Normal                    Physical Exam  General: Well nourished, Cooperative, Alert and Oriented    Airway:  Mallampati: II   Mouth Opening: Normal  TM Distance: Normal  Tongue: Normal  Neck ROM: Normal ROM    Dental:  Intact    Chest/Lungs:  Clear to auscultation, Normal Respiratory Rate    Heart:  Rate: Normal  Rhythm: Regular Rhythm        Anesthesia Plan  Type of Anesthesia, risks & benefits discussed:    Anesthesia Type: MAC  Intra-op Monitoring Plan: Standard ASA Monitors  Post Op Pain Control Plan: multimodal analgesia and IV/PO Opioids PRN  Induction:  IV  Informed Consent: Informed consent signed with the Patient and all parties understand the risks and agree with anesthesia plan.   All questions answered. Patient consented to blood products? No  ASA Score: 2  Day of Surgery Review of History & Physical: H&P Update referred to the surgeon/provider.    Ready For Surgery From Anesthesia Perspective.     .

## 2024-06-28 NOTE — OP NOTE
DATE OF PROCEDURE: 06/28/2024     SURGEON:  Valentine Roberts MD     FIRST ASSISTANT:  Ravin Agee MD (Resident)     PREOPERATIVE DIAGNOSIS:  Right long finger trigger finger and right ring finger trigger finger     POSTOPERATIVE DIAGNOSIS:  Same     PROCEDURE PERFORMED:  A1 pulley release of right long finger and right ring finger.     ANESTHESIA:  Local MAC.     ESTIMATED BLOOD LOSS:  1 mL.     SPECIMENS:  None.     IMPLANTS:  None.     COMPLICATIONS:  None.     TOURNIQUET TIME:  15 minutes at 250mmHg.     INDICATIONS FOR PROCEDURE:  Naidne Frances is a 54 y.o. female who presented to the Orthopedics Clinic complaining of a significantly symptomatic right long finger trigger finger and right ring finger trigger finger.  The risks, benefits and alternatives to surgery were discussed with the patient in detail.  Specific risks discussed include but are not limited to bleeding, infection, vessel and/or nerve damage, pain, numbness, tingling, complex regional pain syndrome, compartment syndrome, failure to return to pre-injury and/or preoperative functional status, scar sensitivity, delayed healing, inability to return to work, pulley injury, tendon injury, bowstringing, partial and/or incomplete relief of symptoms, weakness, persistence of and/or worsening of symptoms, surgical failure, osteomyelitis, amputation, loss of function, stiffness, functional debility, dysfunction, decreased  strength, need for prolonged postoperative rehabilitation, need for further surgery, deep venous thrombosis, pulmonary embolism, arthritis and death.  The patient states an understanding and wishes to proceed with surgery.   All questions were answered.  No guarantees were implied or stated.  Written informed consent was obtained.       PROCEDURE IN DETAIL:  On the date of the operative intervention, the patient was evaluated in the preoperative holding area.  With their participation, the operative finger was marked  as the operative site.  The patient was then wheeled to the Operating Room with the right upper extremity placed on a hand table.  A nonsterile tourniquet was placed on the patient's forearm.  The extremity was prepped and draped in the usual sterile fashion.  A timeout was taken to confirm the correct patient, site and procedure.  All were in agreement, so I proceeded.  I utilized 1% plain lidocaine for local anesthesia in the area overlying the right long and ring finger A1 pulley.  After adequate analgesia, an Esmarch was utilized to exsanguinate the extremity and then tourniquet was insufflated to 250 mmHg where it remained for the duration of the procedure.  I then marked out an approximately 1 cm incision overlying the patient's A1 pulley at the long and ring fingers.  These incisions were made sharply with a scalpel and dissection was carried down through the skin and subcutaneous tissues.  The neurovascular bundles were retracted both radially and ulnarly and protected for the duration of the procedure.  Dissection was carried down more deeply to the level of the A1 pulley.  This was identified and exposed and then a scalpel was utilized to enter the flexor tendon sheath with a small poke hole in the pulley.  At this time, scissor dissection was then utilized to complete both proximal and distal division of the A1 pulley in its entirety.  After direct visual confirmation that the pulley transection was complete, I then utilized a Ragnell retractor to retract the flexor tendons out of the wounds.  There was some resistance encountered due to the bulk of the flexor tendons, but no catching or locking was seen or felt.  A portion of the A1 pulley of the long and ring fingers was excised to facilitate flexor tendon excursion. A Ragnell retractor was used again to retract the flexor tendons out of the wound, which they did so without any resistance and the fingers were taken through range of motion and were noted  to glide smoothly without any evidence of further constriction.  At this time, the wounds were then irrigated copiously with sterile saline and closed with 4-0 nylon in horizontal mattress fashion.      Sterile dressings were then applied consisting of Adaptic, Bacitracin, 4 x 4 gauze, Kerlix, and an Ace wrap. The tourniquet was then deflated and brisk capillary refill ensued throughout the patient's hand.  The patient was then awakened from anesthesia and returned to the Postanesthesia Care Unit in stable condition.  There were no complications.      POSTOPERATIVE PLAN FOR THE PATIENT:  The patient will be discharged home in stable condition.  I will reevaluate the patient in clinic in approximately 2 weeks for suture removal and reevaluation of the postoperative plan.  Range of motion will be as tolerated and unrestricted.

## 2024-06-28 NOTE — BRIEF OP NOTE
[facility] - Surgery ([facility])  Brief Operative Note    Surgery Date: 6/28/2024     Surgeons and Role:     * Valentine Roberts MD - Primary    Assisting Surgeon: None    Pre-op Diagnosis:  Trigger finger, right ring finger [M65.341]  Trigger finger, right middle finger [M65.331]    Post-op Diagnosis:  Post-Op Diagnosis Codes:     * Trigger finger, right ring finger [M65.341]     * Trigger finger, right middle finger [M65.331]    Procedure(s) (LRB):  LEFT RING AND MIDDLE RELEASE, TRIGGER FINGER (Left)    Anesthesia: Local MAC    Operative Findings: Partial A1 pulley resections after release. No triggering.    Estimated Blood Loss: 0 mL         Specimens:   Specimen (24h ago, onward)      None              Discharge Note    OUTCOME: Patient tolerated treatment/procedure well without complication and is now ready for discharge.    DISPOSITION: Home or Self Care    FINAL DIAGNOSIS:  Trigger finger, right middle finger    FOLLOWUP: In clinic in 2 weeks    DISCHARGE INSTRUCTIONS:    Discharge Procedure Orders   Diet general     Call MD for:  temperature >100.4     Call MD for:  persistent nausea and vomiting     Call MD for:  severe uncontrolled pain     Call MD for:  difficulty breathing, headache or visual disturbances     Call MD for:  redness, tenderness, or signs of infection (pain, swelling, redness, odor or green/yellow discharge around incision site)     Call MD for:  hives     Call MD for:  persistent dizziness or light-headedness     Call MD for:  extreme fatigue     Lifting restrictions   Order Comments: No lifting, pushing, or pulling for 2 weeks.     Leave dressing on - Keep it clean, dry, and intact until clinic visit

## 2024-06-28 NOTE — PLAN OF CARE
Preop complete. Sister at BS. Call light in reach,side rails up, bed in lowest position. Pt resting comfortably

## 2024-06-28 NOTE — TRANSFER OF CARE
"Anesthesia Transfer of Care Note    Patient: Nadine Frances    Procedure(s) Performed: Procedure(s) (LRB):  LEFT RING AND MIDDLE RELEASE, TRIGGER FINGER (Left)    Patient location: PACU    Anesthesia Type: general    Transport from OR: Transported from OR on 6-10 L/min O2 by face mask with adequate spontaneous ventilation    Post pain: adequate analgesia    Post assessment: no apparent anesthetic complications    Post vital signs: stable    Level of consciousness: responds to stimulation    Nausea/Vomiting: no nausea/vomiting    Complications: none    Transfer of care protocol was followed      Last vitals: Visit Vitals  /72   Pulse 74   Temp 36.7 °C (98.1 °F) (Oral)   Resp 16   Ht 5' 5" (1.651 m)   Wt 63.5 kg (140 lb)   SpO2 96%   Breastfeeding No   BMI 23.30 kg/m²     "

## 2024-06-28 NOTE — H&P
Chief Complaint:  bilateral hand pain and left dorsal wrist cyst     Referring Provider: Self, Aaareferral      History of Present Illness:  Patient is a 54 y.o. right hand dominant female who presents today with complaints of  bilateral hand pain and left dorsal wrist cyst.  Reports   Bilateral ring and middle finger triggering with associated pain bilaterally which now has worsened over the past 2 years.  She reports concern about bilateral breast implants causing cyst formation in her hands.  She is scheduled for a biopsy her breast in the future. She reports having difficulty opening bottles and painful when writing.   She has a previous history of a right trigger thumb release in 2021  and also left carpal tunnel release which is not causing her any problems.   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  She denies any trauma.  The patient denies any fevers, chills, N/V, D/C and presents for evaluation.     Review of patient's allergies indicates:  No Known Allergies          Past Medical History:   Diagnosis Date    Cancer       basal cell     Hypertension      LBBB (left bundle branch block)              Past Surgical History:   Procedure Laterality Date    BREAST SURGERY Bilateral 12/06/2020     augmentationLUMPECTOMY    CARPAL TUNNEL RELEASE Left 2/10/2021     Procedure: RELEASE, CARPAL TUNNEL - LEFT;  Surgeon: Valentine Roberts MD;  Location: [facility] OR;  Service: Orthopedics;  Laterality: Left;    HAND SURGERY        INJECTION OF STEROID Right 2/10/2021     Procedure: INJECTION, STEROID - RIGHT CARPAL TUNNEL INJECTION;  Surgeon: Valentine Roberts MD;  Location: [facility] OR;  Service: Orthopedics;  Laterality: Right;    TRIGGER FINGER RELEASE Left 2/10/2021     Procedure: RELEASE, TRIGGER FINGER - LEFT THUMB;  Surgeon: Valentine Roberts MD;  Location: [facility] OR;  Service: Orthopedics;  Laterality: Left;       No family history on file.  Social History   Social History            Tobacco Use    Smoking status: Never    Smokeless tobacco: Never   Substance Use Topics    Alcohol use: Yes       Comment: socially            Review of Systems:  Constitutional: Denies fever/chills  Neurological: Denies numbness/tingling (any exceptions noted in orthopaedic exam)   Psychiatric/Behavioral: Denies change in normal mood  Eyes: Denies change in vision  Cardiovascular: Denies chest pain  Respiratory: Denies shortness of breath  Hematologic/Lymphatic: Denies easy bleeding/bruising   Skin: Denies new rash or skin lesions   Gastrointestinal: Denies nausea/vomitting/diarrhea, change in bowel habits, abdominal pain   Allergic/Immunologic: Denies adverse reactions to current medications  Musculoskeletal: see HPI        OBJECTIVE:      Vital Signs (Most Recent)     Physical Exam:  Gen:  No acute distress  CV:  Peripherally well-perfused.  Pulses 2+ bilaterally.  Lungs:  Normal respiratory effort.  Abdomen:  Soft, non-tender, non-distended  Head/Neck:  Normocephalic.  Atraumatic. No TTP, AROM and PROM intact without pain  Neuro:  CN intact without deficit, SILT throughout B/L Upper & Lower Extremities     Bilateral Hand/Wrist Examination:     Observation/Inspection:  Swelling                        Mild swelling on volar aspect of 3rd and 4th MCP,  left                      Deformity                     none  Discoloration               none                  Scars                           none                  Atrophy                        none  + TTP A1 pulley bilateral ring and middle finger. Palpable NAtas nodule on bilateral middle finger A1 pulley.  Palpable raised firm mass on left dorsum wrist at radiocarpal joint.   HAND/WRIST EXAMINATION:  Finkelstein's Test                                Neg  WHAT Test                                         Neg  Snuff box tenderness                          Neg  Rubio's Test                                      Neg  Hook of Hamate Tenderness              Neg  CMC grind                                           Neg  Circumduction test                              Ne     Neurovascular Exam:  Digits WWP, brisk CR < 3s throughout  NVI motor/LTS to M/R/U nerves, radial pulse 2+  Tinel's Test - Carpal Tunnel                 Positive right  Tinel's Test - Cubital Tunnel               Pos right   Phalen's Test                                      Neg   Positive Durkan's right       ROM hand full, painless     ROM wrist full, painless    ROM elbow full, painless     Abdomen not guarded  Respirations nonlabored  Perfusion intact     Diagnostic Results:      Bilateral hand x-ray 5/21/2024     FINDINGS:  No acute fracture or dislocation seen.     No significant osteophyte formation, suspicious osseous erosions, or joint space narrowing.     No significant soft tissue edema or radiopaque retained foreign body.     Impression:     No acute osseous abnormality seen.  Imaging - I independently viewed the patient's imaging as well as the radiology report.    EMG - none     ASSESSMENT/PLAN:      Nadine was seen today for pain, ganglion cyst, pain and ganglion cyst.     Diagnoses and all orders for this visit:     Ganglion cyst of dorsum of left wrist     Trigger finger, right ring finger     Trigger finger, right middle finger     Carpal tunnel syndrome, right           54 y.o. yo female with  bilateral trigger finger of the right  ringand middle finger  and left wrist dorsal ganglion cyst        Plan:   X-ray reviewed and discussed with patient.  Negative for any dislocations or fractures.  Based off HPI and exam today bilateral hand pain consistent with multiple digit trigger finger with palpable Natas nodule.   Pathophysiology was fully discussed with patient today.  Patient would like to proceed with surgery but would like to wait following her biopsy of her breast.   Left dorsal wrist mass consistent with ganglion  cyst.  Discussed with patient pathophysiology of ganglion cyst which  she could  be managed with observation versus surgical intervention.   Surgical consent  signed in clinic today.   Patient will determine which extremity is worse and will let the clinic know.    For her right-hand : surgical consent for right carpal tunnel release and right ring and middle finger release and the other consent was fot left ring and middle finger trigger finger release.

## 2024-06-28 NOTE — ANESTHESIA POSTPROCEDURE EVALUATION
Anesthesia Post Evaluation    Patient: Nadine Frances    Procedure(s) Performed: Procedure(s) (LRB):  LEFT RING AND MIDDLE RELEASE, TRIGGER FINGER (Left)    Final Anesthesia Type: general      Patient location during evaluation: PACU  Patient participation: Yes- Able to Participate  Level of consciousness: awake and alert and oriented  Post-procedure vital signs: reviewed and stable  Pain management: adequate  Airway patency: patent    PONV status at discharge: No PONV  Anesthetic complications: no      Cardiovascular status: blood pressure returned to baseline and hemodynamically stable  Respiratory status: unassisted, spontaneous ventilation and room air  Hydration status: euvolemic  Follow-up not needed.              Vitals Value Taken Time   /76 06/28/24 0847   Temp 36.6 °C (97.9 °F) 06/28/24 0828   Pulse 68 06/28/24 0900   Resp 23 06/28/24 0900   SpO2 99 % 06/28/24 0900   Vitals shown include unfiled device data.      Event Time   Out of Recovery 08:57:08         Pain/Jana Score: Pain Rating Prior to Med Admin: 0 (6/28/2024  8:57 AM)  Jana Score: 10 (6/28/2024  8:57 AM)

## 2024-07-12 ENCOUNTER — OFFICE VISIT (OUTPATIENT)
Dept: ORTHOPEDICS | Facility: CLINIC | Age: 54
End: 2024-07-12
Payer: COMMERCIAL

## 2024-07-12 VITALS — HEIGHT: 65 IN | BODY MASS INDEX: 23.32 KG/M2 | WEIGHT: 140 LBS

## 2024-07-12 DIAGNOSIS — M65.341 TRIGGER FINGER, RIGHT RING FINGER: Primary | ICD-10-CM

## 2024-07-12 DIAGNOSIS — M65.331 TRIGGER FINGER, RIGHT MIDDLE FINGER: ICD-10-CM

## 2024-07-12 DIAGNOSIS — Z98.890 POSTOPERATIVE STATE: ICD-10-CM

## 2024-07-12 PROCEDURE — 99024 POSTOP FOLLOW-UP VISIT: CPT | Mod: S$GLB,,,

## 2024-07-12 PROCEDURE — 1159F MED LIST DOCD IN RCRD: CPT | Mod: CPTII,S$GLB,,

## 2024-07-12 PROCEDURE — 1160F RVW MEDS BY RX/DR IN RCRD: CPT | Mod: CPTII,S$GLB,,

## 2024-07-12 PROCEDURE — 99999 PR PBB SHADOW E&M-EST. PATIENT-LVL III: CPT | Mod: PBBFAC,,,

## 2024-07-12 NOTE — PROGRESS NOTES
7/12/2024  Ms. Frances is here today for a post-operative visit.  She is 14 days status post left ring and middle finger TFR by Dr. Roberts on 6/28/2024. She reports that she is well.  Pain is 2/10.  She is  taking pain medication as needed.  She denies fever, chills, and sweats since the time of the surgery.  She denies any numbness or tingling.  She states her only concern is the stiffness/swelling she has in her left ring and middle finger.  She states she is going out of town on a few trips after this visit.    Physical exam:    There were no vitals filed for this visit.  Vital signs are stable, patient is afebrile.  Patient is well dressed and well groomed, no acute distress.  Alert and oriented to person, place, and time.  Post op dressing taken down.  Incision is clean, dry and intact.  There is no erythema or exudate.  There is no sign of any infection. She is NVI. Sutures removed without difficulty.  Patient was unable to make a composite fist with noted in left ring and middle finger swelling and stiffness.    Assessment:   s/p left ring and middle finger TFR     Nadine was seen today for post-op evaluation and swelling.    Diagnoses and all orders for this visit:    Trigger finger, right ring finger  -     Ambulatory referral/consult to Physical/Occupational Therapy; Future    Trigger finger, right middle finger  -     Ambulatory referral/consult to Physical/Occupational Therapy; Future    Postoperative state  -     Ambulatory referral/consult to Physical/Occupational Therapy; Future         Plan:      - PO instruction reviewed and provided to patient  Educated patient on scar massage to be done 3 times a day for at least 5 minutes.  We had a long discussion on how to do finger range of motion with isolation and also with passive range of motion to help improve her stiffness and swelling.  I did mention to patient she will benefit from occupational therapy in a least a few visits before her future  trips.  I will have her follow-up following her trip which will be on August 7 to assess her range of motion and swelling.  Recommended hand elevation and also icing to help improve the swelling.

## 2024-07-18 ENCOUNTER — CLINICAL SUPPORT (OUTPATIENT)
Dept: REHABILITATION | Facility: HOSPITAL | Age: 54
End: 2024-07-18
Payer: COMMERCIAL

## 2024-07-18 DIAGNOSIS — M25.641 DECREASED RANGE OF MOTION OF FINGER OF RIGHT HAND: ICD-10-CM

## 2024-07-18 DIAGNOSIS — M79.644 FINGER PAIN, RIGHT: Primary | ICD-10-CM

## 2024-07-18 DIAGNOSIS — M65.331 TRIGGER FINGER, RIGHT MIDDLE FINGER: ICD-10-CM

## 2024-07-18 DIAGNOSIS — M65.341 TRIGGER FINGER, RIGHT RING FINGER: ICD-10-CM

## 2024-07-18 DIAGNOSIS — Z98.890 POSTOPERATIVE STATE: ICD-10-CM

## 2024-07-18 PROBLEM — M25.60 RANGE OF MOTION DEFICIT: Status: RESOLVED | Noted: 2018-01-25 | Resolved: 2024-07-18

## 2024-07-18 PROBLEM — R29.898 THUMB WEAKNESS: Status: RESOLVED | Noted: 2018-01-25 | Resolved: 2024-07-18

## 2024-07-18 PROBLEM — M65.311 TRIGGER FINGER OF RIGHT THUMB: Status: RESOLVED | Noted: 2018-01-05 | Resolved: 2024-07-18

## 2024-07-18 PROCEDURE — 97110 THERAPEUTIC EXERCISES: CPT

## 2024-07-18 PROCEDURE — 97140 MANUAL THERAPY 1/> REGIONS: CPT

## 2024-07-18 PROCEDURE — 97165 OT EVAL LOW COMPLEX 30 MIN: CPT

## 2024-07-18 NOTE — PATIENT INSTRUCTIONS
"[facility], OCCUPATIONAL THERAPY  HOME EXERCISE PROGRAM     Complete the following stretches holding for 30 seconds per stretch. Complete 3 of each stretch, 3x/day.     Composite wrist extension stretch:  -Hold arm straight out in front of you, elbow extended, and palm up  -Use other hand to bend the wrist back  -Make sure to bend at the wrist, not at the finger tips! Avoid hyperextension of the fingers  - Hold for 30 seconds. Repeat 3x.              Composite Extension (Passive Flexor Stretch)  Sitting with elbows on table and palms together, slowly lower wrists toward table until stretch is felt. Be sure to keep palms together throughout stretch.   - Hold for 30 seconds. Repeat 3x.        Hook Stretch  Use other hand to bend middle and tip joints of your finger.       Complete the following exercises for 10 repetitions each, 5x/day:         AROM: DIP Flexion / Extension  - Pinch middle knuckle to prevent bending  - Bend end knuckle until stretch is felt  - Hold 3 seconds. Relax. Straighten finger as far as possible.          AROM: PIP Flexion / Extension  - Begin with your hand on a table with palm up  - Hold other fingers down with opposite hand  - Raise your finger up and down at the middle joint line (PIP joint) as shown   - Hold 3 seconds. Relax. Straighten finger as far as possible.        AROM Hook Fist  - Bend only your middle and end knuckles, creating a "hook" (as if reaching to touch the base of your finger). Hold 2-3 seconds.   - Then straighten your fingers, pointing towards the ceiling.         AROM Table Top ("Wave")   - Bend only your large, bottom knuckles. Keep the tips of your fingers straight and flat like a table top. Hold 3 seconds.   - Then straighten your fingers, pointing towards the ceiling.           AROM Straight Fist  - Bend your bottom and middle knuckles, keeping the tips of your fingers straight, as if pointing towards your elbow  - Try to touch the bottom of your " "palm with the pads of your fingers. Hold 3 seconds.   - Then straighten your fingers, pointing towards the ceiling.          AROM Full Fist  - Bend every joint in your hand into a fist. Hold 3 seconds.   - Then straighten your fingers, pointing towards the ceiling.          AROM: Abduction / Adduction  - With hand flat on table, spread all fingers apart,   then bring them together as close as possible.        AROM: Composte Extension ("Finger Lifts")  - Lift your finger off of the table one at a time  - Hold 3 seconds. Relax your finger.  *you can also stretch the finger back in this position        Reverse PIP Extension  - Use your uninvolved hand to block large knuckles from moving, acting as a "roof"  - Bend and straighten at the PIP joint only (middle knuckle) while keeping the big knuckles bent  *Focus on straightening the fingers against the "roof" formed by your other hand          PIP Extension with a Pen  - Interlace a pen between the fingers so that the involved finger lies BELOW the pen (see first photo as an example for the middle finger)  - Proceed to bend and straighten the middle knuckle of the involved finger  *Focus on straightening the middle knuckle against the pen    Therapist: PADMINI Sloan/L       "

## 2024-07-18 NOTE — PLAN OF CARE
[facility] Occupational Therapy  Initial Evaluation     Date: 7/18/2024  Patient: Nadine Frances  Chart Number: [id number]    Therapy Diagnosis:   1. Finger pain, right        2. Trigger finger, right ring finger  Ambulatory referral/consult to Physical/Occupational Therapy      3. Trigger finger, right middle finger  Ambulatory referral/consult to Physical/Occupational Therapy      4. Postoperative state  Ambulatory referral/consult to Physical/Occupational Therapy      5. Decreased range of motion of finger of right hand          Medical Diagnosis: M65.341 (ICD-10-CM) - Trigger finger, right ring finger M65.331 (ICD-10-CM) - Trigger finger, right middle finger Z98.890 (ICD-10-CM) - Postoperative state    Referring Physician: Barbara Drew NP  Physician Orders: Eval and Treat  Note:   S/p left ring and middle trigger finger.  Date of Return to MD: 8/7/24    Date of Injury: chronic  Date of Surgery: 6/28/24  Left ring and middle finger A1 pulley release    Evaluation Date: 7/18/2024  Authorization Period: 7/12/24 - 12/31/24  Plan of Care Expiration: 10/10/24  Visit #/ Visits Authorized: 1 of 1  FOTO Completion: Initial eval (7/18/2024)  FOTO #2:  FOTO #3:    Time In: 3:15 pm  Time Out: 3:55 pm  Total Billable Time: 40 min    Precautions: Standard    Subjective     History of Current Condition: Nadine Frances is a 54 y.o. year old Right hand dominant female who underwent A1 pulley release of the left ring and middle fingers on 6/28/24. Nadine Frances is referred to Occupational Therapy for evaluation and treatment. Patient presents today alone.    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    Nadine Frances  has a past surgical history that includes Hand surgery; Breast surgery (Bilateral, 2020); Carpal tunnel release (Left, 02/10/2021); Trigger finger release (Left, 02/10/2021); Injection of steroid (Right, 02/10/2021); Foot surgery (Right); Augmentation of breast (Bilateral);  section; and Trigger finger release (Left, 2024).    Nadine has a current medication list which includes the following prescription(s): amlodipine, montelukast sodium, tramadol, and vitamin d.    Review of patient's allergies indicates:  No Known Allergies       Objective     Mental status: alert, oriented x3    Observation/Appearance:   Two scars about RF/MF A1 pulley, fully closed, thick with fair mobility    Sensation: Patient denies numbness/tingling      Edema:  (Measured circumferential, in centimeters)  Digits: Right  2024 Left  2024        Long:       P1 6.5 6.7    PIP 6.5 6.5   P2 5.8 5.5              Ring:       P1            5.7 6.0    PIP            5.7 5.7   P2             5.0 5.0             Digit AROM:  Measured in degrees of active motion with goniometer and/or distance measured from finger tip to the distal palmar crease (DPC)   Right  2024 Left  2024        Long:  MP 0              PIP 0/95 -              DIP 0 065              DONOVAN [phone]         Ring:   MP 0/85 0/88              PIP 0/105 0/98              DIP 0/50 0/47              DONOVAN              STRENGTH: Not tested due to precautions  (Measured in pounds using a Dynamometer and pinch meter)   Right   Left      Setting 2      Average         Intake Outcome Measure for FOTO Initial Evaluation Survey    Therapist reviewed FOTO scores for Nadine Frances on 7/18/2024.   FOTO documents entered into [employer] - see Media section.    Intake Score: 41%         Treatment     Treatment Time In: 3:30 pm  Treatment Time Out: 3:55 pm  Total Treatment time separate from Evaluation time: 25 min    Supervised modalities after being cleared for contraindications: Hot pack applied to the Left for 5 minutes for increased blood flow and circulation, increased tissue extensibility, and pain management.      Nadine received the following manual therapy techniques for 8 minutes:   - PROM hook fist, digit extension  - Vibration to scar sites      Nadine performed therapeutic exercises for 12 minutes including:  - AROM isolated DIP/PIP, tendon glides, reverse PIP extension, lifts (10 ea)  - Provided with compression glove, to be worn during the day  - Composite wrist/digit extension        Patient Education and Home Exercises     Patient/Family Education Provided:   - Role of OT, goals for OT, scheduling/cancellations - pt verbalized understanding. Discussed insurance limitations with patient.    Written Home Exercises Provided: yes.  Exercises were reviewed and Nadine was able to demonstrate them prior to the end of the session.  Nadine demonstrated good  understanding of the education provided. See EMR under Patient Instructions for exercises provided during therapy sessions.     Pt was advised to perform these exercises free of pain, and to stop performing them if pain occurs.      Assessment     Nadine Frances is a 54 y.o. female referred to outpatient occupational therapy and presents with a  medical diagnosis of M65.341 (ICD-10-CM) - Trigger finger, right ring finger M65.331 (ICD-10-CM) - Trigger finger, right middle finger Z98.890 (ICD-10-CM) - Postoperative state. Patient presents with pain, swelling, decreased ROM/extension lag, and limited scar mobility interfering with functional use in daily activities.  Nadine Frances will benefit from continued skilled OT services to progress with the above deficits and facilitate increased functional use of LUE.    Following medical record review it is determined that pt will benefit from occupational therapy services in order to maximize pain free and/or functional use of left hand. The following goals were discussed with the patient and patient is in agreement with them as to be addressed in the treatment plan. The patient's rehab potential is Good.     Anticipated barriers to occupational therapy: none    Plan of care discussed with patient: Yes    Patient's spiritual, cultural and educational needs considered and patient is agreeable to the plan of care and goals as stated below:     Medical Necessity is demonstrated by the following  Occupational Profile/History  Co-morbidities and personal factors that may impact the plan of care [x] LOW: Brief chart review  [] MODERATE: Expanded chart review   [] HIGH: Extensive chart review    Moderate / High Support Documentation:      Examination  Performance deficits relating to physical, cognitive or psychosocial skills that result in activity limitations and/or participation restrictions  [] LOW: addressing 1-3 Performance deficits  [x] MODERATE: 3-5 Performance deficits  [] HIGH: 5+ Performance deficits (please support below)    Moderate / High Support Documentation:    Physical:  Joint Mobility  Muscle Power/Strength  Skin Integrity/Scar Formation   Strength  Pain    Cognitive:  No Deficits    Psychosocial:    Habits  Routines     Treatment Options [] LOW: Limited options  [x] MODERATE: Several  options  [] HIGH: Multiple options      Decision Making/ Complexity Score: low         The following goals were discussed with the patient and patient is in agreement with them as to be addressed in the treatment plan.     Long Term Goals (to be met by discharge):  Date Goal Met:     1.) Nadine Frances will demonstrate significantly improved functional performance from re-assessment as measured by a FOTO Intake score of more than 60.    Goal Status:   In progress    2.) Nadine Frances will return to near to prior level of function for ADLs and household management reporting independence or modified independence.    Goal Status:   In progress    3. Nadine Frances will report pain 2 out of 10 at worst to increase functional use of affected hand for work and leisure tasks.    Goal Status:   In progress     Short Term Goals (to be met by 8/15/24):  Date Goal Met:     Nadine Frances will be independent with home exercise program with written instructions.    Goal Status:   In progress    Nadine Frances will demonstrate 245 degrees of DONOVAN of MF to improve functional performance in ADLs/work/leisure tasks.    Goal Status:   In progress    Nadine Frances will demonstrate within 20 lbs of  strength compared to unaffected hand to improve functional grasp for ADLs/work/leisure tasks.    Goal Status:   In progress    Nadine Frances will demonstrate the ability to complete ADL/IADL tasks with 4/10 pain.    Goal Status:   In progress       Plan     Pt to be treated by Occupational Therapy 1 times per week for 12 weeks during the certification period from 7/18/2024 to 10/10/24 to achieve the established goals.     Treatment to include: Paraffin, Fluidotherapy, Manual therapy/joint mobilizations, Modalities for pain management, US 3 mhz, Therapeutic exercises/activities., Iontophoresis with 2.0 cc Dexamethasone, Strengthening, Orthotic Fabrication/Fit/Training,  Edema Control, Scar Management, Electrical Modalities, Joint Protection, and Energy Conservation, as well as any other treatments deemed necessary based on the patient's needs or progress.       Janet Alvarado, OTR/L, CHT

## 2024-08-02 ENCOUNTER — TELEPHONE (OUTPATIENT)
Dept: ORTHOPEDICS | Facility: CLINIC | Age: 54
End: 2024-08-02
Payer: COMMERCIAL

## 2024-08-07 ENCOUNTER — OFFICE VISIT (OUTPATIENT)
Dept: ORTHOPEDICS | Facility: CLINIC | Age: 54
End: 2024-08-07
Payer: COMMERCIAL

## 2024-08-07 VITALS — HEIGHT: 65 IN | BODY MASS INDEX: 23.32 KG/M2 | WEIGHT: 140 LBS

## 2024-08-07 DIAGNOSIS — M65.341 TRIGGER FINGER, RIGHT RING FINGER: ICD-10-CM

## 2024-08-07 DIAGNOSIS — M65.331 TRIGGER FINGER, RIGHT MIDDLE FINGER: ICD-10-CM

## 2024-08-07 DIAGNOSIS — Z98.890 POST-OPERATIVE STATE: Primary | ICD-10-CM

## 2024-08-07 PROCEDURE — 99999 PR PBB SHADOW E&M-EST. PATIENT-LVL III: CPT | Mod: PBBFAC,,,

## 2024-10-31 ENCOUNTER — DOCUMENTATION ONLY (OUTPATIENT)
Dept: ORTHOPEDICS | Facility: CLINIC | Age: 54
End: 2024-10-31
Payer: COMMERCIAL

## 2024-10-31 ENCOUNTER — PATIENT MESSAGE (OUTPATIENT)
Dept: ORTHOPEDICS | Facility: CLINIC | Age: 54
End: 2024-10-31
Payer: COMMERCIAL

## 2024-10-31 DIAGNOSIS — M65.331 TRIGGER FINGER, RIGHT MIDDLE FINGER: Primary | ICD-10-CM

## 2024-10-31 DIAGNOSIS — M65.341 TRIGGER FINGER, RIGHT RING FINGER: ICD-10-CM

## 2024-11-11 ENCOUNTER — ANESTHESIA EVENT (OUTPATIENT)
Dept: SURGERY | Facility: HOSPITAL | Age: 54
End: 2024-11-11
Payer: COMMERCIAL

## 2024-11-20 RX ORDER — ONDANSETRON 4 MG/1
4 TABLET, ORALLY DISINTEGRATING ORAL EVERY 6 HOURS PRN
Qty: 14 TABLET | Refills: 0 | Status: SHIPPED | OUTPATIENT
Start: 2024-11-20

## 2024-11-20 RX ORDER — TRAMADOL HYDROCHLORIDE 50 MG/1
50 TABLET ORAL EVERY 6 HOURS PRN
Qty: 10 TABLET | Refills: 0 | Status: SHIPPED | OUTPATIENT
Start: 2024-11-20

## 2024-11-21 ENCOUNTER — TELEPHONE (OUTPATIENT)
Dept: ORTHOPEDICS | Facility: CLINIC | Age: 54
End: 2024-11-21
Payer: COMMERCIAL

## 2024-11-21 NOTE — H&P
H&P      Interval history 11/22/2024  Patient is here today for scheduled right ring and long finger trigger finger release.  No interval change in symptoms.    8/7/2024   Ms. Frances is here today for a post-operative visit.  She is 5 weeks/5 days status post left ring and middle finger TFR by Dr. Roberts on 6/28/2024.  She reports she is doing well.  Reports improvement in her range of motion which she has been HEP performing at home.  She reports she went to 1 therapy session and preferred to do her exercises at home.  She denies any numbness or tingling.  Her only complaint every now and then is she feels pulling of her left long finger on extension.  She reports she has been gradually increasing her weight up to 5 lb and had some pain lifting 20 lb dumbbell with her .    7/12/2024  Ms. Frances is here today for a post-operative visit.  She is 14 days status post left ring and middle finger TFR by Dr. Roberts on 6/28/2024. She reports that she is well.  Pain is 2/10.  She is  taking pain medication as needed.  She denies fever, chills, and sweats since the time of the surgery.  She denies any numbness or tingling.  She states her only concern is the stiffness/swelling she has in her left ring and middle finger.  She states she is going out of town on a few trips after this visit.    Physical exam:    There were no vitals filed for this visit.    General appearance: A&Ox3. NAD.   HEENT: Normocephalic, head atraumatic. Conjuntiva normal. EOMI. External appearance of nose, mouth, ears wnl.  Neck: Supple. Trachea midline.  Respiratory: Breathing unlabored on room air. Symmetric chest rise.   CV: Extremities warm and well perfused.  Neurologic: No focal motor or sensory deficits.   Psychatric: A&Ox3. Appropriate mood and affect.  Skin: Warm, dry, well perfused. No rash.    Right hand exam  Tender to palpation over the A1 pulley of the right ring and long fingers.  Palpable nodules long finger A1  region.  Reproducible triggering of the ring and long finger..  Otherwise good range of motion of the hand, wrist, elbow.  Neurovascularly intact motor and sensation all distributions.  Brisk capillary refill all digits.      Assessment/Plan:    Right ring and long finger trigger fingers   s/p left ring and middle finger TFR       To OR for right ring and long finger trigger finger release.

## 2024-11-21 NOTE — H&P
H&P      Interval history 11/22/2024  Patient is here today for scheduled right ring and long finger trigger finger release.  No interval change in symptoms.      8/7/2024   Ms. Frances is here today for a post-operative visit.  She is 5 weeks/5 days status post left ring and middle finger TFR by Dr. Roberts on 6/28/2024.  She reports she is doing well.  Reports improvement in her range of motion which she has been HEP performing at home.  She reports she went to 1 therapy session and preferred to do her exercises at home.  She denies any numbness or tingling.  Her only complaint every now and then is she feels pulling of her left long finger on extension.  She reports she has been gradually increasing her weight up to 5 lb and had some pain lifting 20 lb dumbbell with her .    7/12/2024  Ms. Frances is here today for a post-operative visit.  She is 14 days status post left ring and middle finger TFR by Dr. Roberts on 6/28/2024. She reports that she is well.  Pain is 2/10.  She is  taking pain medication as needed.  She denies fever, chills, and sweats since the time of the surgery.  She denies any numbness or tingling.  She states her only concern is the stiffness/swelling she has in her left ring and middle finger.  She states she is going out of town on a few trips after this visit.    Physical exam:    There were no vitals filed for this visit.  Vital signs are stable, patient is afebrile.  Patient is well dressed and well groomed, no acute distress.  Alert and oriented to person, place, and time.   Healed palmar incisions. Incision is clean, dry and intact.  There is no erythema or exudate.  There is no sign of any infection. She is NVI.  Patient was able to make a composite fist with good ROM        Assessment/Plan:    Right ring and long finger trigger fingers   s/p left ring and middle finger TFR     Plan:  To OR for right ring and long finger trigger finger release.

## 2024-11-22 ENCOUNTER — HOSPITAL ENCOUNTER (OUTPATIENT)
Facility: HOSPITAL | Age: 54
Discharge: HOME | End: 2024-11-22
Attending: ORTHOPAEDIC SURGERY | Admitting: ORTHOPAEDIC SURGERY
Payer: COMMERCIAL

## 2024-11-22 ENCOUNTER — ANESTHESIA (OUTPATIENT)
Dept: SURGERY | Facility: HOSPITAL | Age: 54
End: 2024-11-22
Payer: COMMERCIAL

## 2024-11-22 VITALS
BODY MASS INDEX: 23.16 KG/M2 | HEART RATE: 56 BPM | RESPIRATION RATE: 12 BRPM | SYSTOLIC BLOOD PRESSURE: 124 MMHG | WEIGHT: 139 LBS | HEIGHT: 65 IN | DIASTOLIC BLOOD PRESSURE: 77 MMHG | TEMPERATURE: 98 F | OXYGEN SATURATION: 100 %

## 2024-11-22 DIAGNOSIS — M65.341 TRIGGER FINGER, RIGHT RING FINGER: ICD-10-CM

## 2024-11-22 DIAGNOSIS — M65.331 TRIGGER FINGER, RIGHT MIDDLE FINGER: Primary | ICD-10-CM

## 2024-11-22 LAB
B-HCG UR QL: NEGATIVE
CTP QC/QA: YES

## 2024-11-22 PROCEDURE — 63600175 PHARM REV CODE 636 W HCPCS: Performed by: NURSE ANESTHETIST, CERTIFIED REGISTERED

## 2024-11-22 PROCEDURE — 71000033 HC RECOVERY, INTIAL HOUR: Performed by: ORTHOPAEDIC SURGERY

## 2024-11-22 PROCEDURE — 63600175 PHARM REV CODE 636 W HCPCS: Performed by: ORTHOPAEDIC SURGERY

## 2024-11-22 PROCEDURE — 37000008 HC ANESTHESIA 1ST 15 MINUTES: Performed by: ORTHOPAEDIC SURGERY

## 2024-11-22 PROCEDURE — 25000003 PHARM REV CODE 250: Performed by: ORTHOPAEDIC SURGERY

## 2024-11-22 PROCEDURE — 71000015 HC POSTOP RECOV 1ST HR: Performed by: ORTHOPAEDIC SURGERY

## 2024-11-22 PROCEDURE — 37000009 HC ANESTHESIA EA ADD 15 MINS: Performed by: ORTHOPAEDIC SURGERY

## 2024-11-22 PROCEDURE — 26055 INCISE FINGER TENDON SHEATH: CPT | Mod: F7,,, | Performed by: ORTHOPAEDIC SURGERY

## 2024-11-22 PROCEDURE — 99900035 HC TECH TIME PER 15 MIN (STAT)

## 2024-11-22 PROCEDURE — 36000707: Performed by: ORTHOPAEDIC SURGERY

## 2024-11-22 PROCEDURE — 25000003 PHARM REV CODE 250: Performed by: PHYSICIAN ASSISTANT

## 2024-11-22 PROCEDURE — 81025 URINE PREGNANCY TEST: CPT | Performed by: ORTHOPAEDIC SURGERY

## 2024-11-22 PROCEDURE — 25000003 PHARM REV CODE 250: Performed by: STUDENT IN AN ORGANIZED HEALTH CARE EDUCATION/TRAINING PROGRAM

## 2024-11-22 PROCEDURE — 25000003 PHARM REV CODE 250: Performed by: NURSE ANESTHETIST, CERTIFIED REGISTERED

## 2024-11-22 PROCEDURE — 36000706: Performed by: ORTHOPAEDIC SURGERY

## 2024-11-22 PROCEDURE — 94761 N-INVAS EAR/PLS OXIMETRY MLT: CPT

## 2024-11-22 RX ORDER — CEFAZOLIN SODIUM 1 G/3ML
INJECTION, POWDER, FOR SOLUTION INTRAMUSCULAR; INTRAVENOUS
Status: DISCONTINUED | OUTPATIENT
Start: 2024-11-22 | End: 2024-11-22

## 2024-11-22 RX ORDER — MUPIROCIN 20 MG/G
OINTMENT TOPICAL
Status: DISCONTINUED | OUTPATIENT
Start: 2024-11-22 | End: 2024-11-22 | Stop reason: HOSPADM

## 2024-11-22 RX ORDER — FENTANYL CITRATE 50 UG/ML
25 INJECTION, SOLUTION INTRAMUSCULAR; INTRAVENOUS EVERY 5 MIN PRN
Status: DISCONTINUED | OUTPATIENT
Start: 2024-11-22 | End: 2024-11-22 | Stop reason: HOSPADM

## 2024-11-22 RX ORDER — CEFAZOLIN 2 G/1
2 INJECTION, POWDER, FOR SOLUTION INTRAMUSCULAR; INTRAVENOUS
Status: DISCONTINUED | OUTPATIENT
Start: 2024-11-22 | End: 2024-11-22 | Stop reason: HOSPADM

## 2024-11-22 RX ORDER — LIDOCAINE HYDROCHLORIDE 20 MG/ML
INJECTION INTRAVENOUS
Status: DISCONTINUED | OUTPATIENT
Start: 2024-11-22 | End: 2024-11-22

## 2024-11-22 RX ORDER — FENTANYL CITRATE 50 UG/ML
INJECTION, SOLUTION INTRAMUSCULAR; INTRAVENOUS
Status: DISCONTINUED | OUTPATIENT
Start: 2024-11-22 | End: 2024-11-22

## 2024-11-22 RX ORDER — SODIUM CHLORIDE 0.9 % (FLUSH) 0.9 %
10 SYRINGE (ML) INJECTION
Status: DISCONTINUED | OUTPATIENT
Start: 2024-11-22 | End: 2024-11-22 | Stop reason: HOSPADM

## 2024-11-22 RX ORDER — ACETAMINOPHEN 325 MG/1
325 TABLET ORAL EVERY 6 HOURS PRN
COMMUNITY

## 2024-11-22 RX ORDER — FAMOTIDINE 10 MG/ML
INJECTION INTRAVENOUS
Status: DISCONTINUED | OUTPATIENT
Start: 2024-11-22 | End: 2024-11-22

## 2024-11-22 RX ORDER — GLUCAGON 1 MG
1 KIT INJECTION
Status: DISCONTINUED | OUTPATIENT
Start: 2024-11-22 | End: 2024-11-22 | Stop reason: HOSPADM

## 2024-11-22 RX ORDER — ACETAMINOPHEN 500 MG
1000 TABLET ORAL
Status: COMPLETED | OUTPATIENT
Start: 2024-11-22 | End: 2024-11-22

## 2024-11-22 RX ORDER — PROPOFOL 10 MG/ML
VIAL (ML) INTRAVENOUS
Status: DISCONTINUED | OUTPATIENT
Start: 2024-11-22 | End: 2024-11-22

## 2024-11-22 RX ORDER — DROPERIDOL 2.5 MG/ML
0.62 INJECTION, SOLUTION INTRAMUSCULAR; INTRAVENOUS ONCE AS NEEDED
Status: DISCONTINUED | OUTPATIENT
Start: 2024-11-22 | End: 2024-11-22 | Stop reason: HOSPADM

## 2024-11-22 RX ORDER — BACITRACIN ZINC 500 UNIT/G
OINTMENT (GRAM) TOPICAL
Status: DISCONTINUED | OUTPATIENT
Start: 2024-11-22 | End: 2024-11-22 | Stop reason: HOSPADM

## 2024-11-22 RX ORDER — MIDAZOLAM HYDROCHLORIDE 1 MG/ML
INJECTION, SOLUTION INTRAMUSCULAR; INTRAVENOUS
Status: DISCONTINUED | OUTPATIENT
Start: 2024-11-22 | End: 2024-11-22

## 2024-11-22 RX ORDER — ONDANSETRON HYDROCHLORIDE 2 MG/ML
INJECTION, SOLUTION INTRAVENOUS
Status: DISCONTINUED | OUTPATIENT
Start: 2024-11-22 | End: 2024-11-22

## 2024-11-22 RX ORDER — PROPOFOL 10 MG/ML
VIAL (ML) INTRAVENOUS CONTINUOUS PRN
Status: DISCONTINUED | OUTPATIENT
Start: 2024-11-22 | End: 2024-11-22

## 2024-11-22 RX ORDER — LIDOCAINE HYDROCHLORIDE 10 MG/ML
INJECTION, SOLUTION EPIDURAL; INFILTRATION; INTRACAUDAL; PERINEURAL
Status: DISCONTINUED | OUTPATIENT
Start: 2024-11-22 | End: 2024-11-22 | Stop reason: HOSPADM

## 2024-11-22 RX ORDER — BUPIVACAINE HYDROCHLORIDE 2.5 MG/ML
INJECTION, SOLUTION EPIDURAL; INFILTRATION; INTRACAUDAL
Status: DISCONTINUED | OUTPATIENT
Start: 2024-11-22 | End: 2024-11-22 | Stop reason: HOSPADM

## 2024-11-22 RX ADMIN — CEFAZOLIN 2 G: 330 INJECTION, POWDER, FOR SOLUTION INTRAMUSCULAR; INTRAVENOUS at 07:11

## 2024-11-22 RX ADMIN — ACETAMINOPHEN 1000 MG: 500 TABLET ORAL at 07:11

## 2024-11-22 RX ADMIN — FENTANYL CITRATE 50 MCG: 50 INJECTION, SOLUTION INTRAMUSCULAR; INTRAVENOUS at 07:11

## 2024-11-22 RX ADMIN — FAMOTIDINE 20 MG: 10 INJECTION, SOLUTION INTRAVENOUS at 07:11

## 2024-11-22 RX ADMIN — PROPOFOL 125 MCG/KG/MIN: 10 INJECTION, EMULSION INTRAVENOUS at 07:11

## 2024-11-22 RX ADMIN — ONDANSETRON 4 MG: 2 INJECTION INTRAMUSCULAR; INTRAVENOUS at 07:11

## 2024-11-22 RX ADMIN — PROPOFOL 50 MG: 10 INJECTION, EMULSION INTRAVENOUS at 07:11

## 2024-11-22 RX ADMIN — MUPIROCIN: 20 OINTMENT TOPICAL at 07:11

## 2024-11-22 RX ADMIN — LIDOCAINE HYDROCHLORIDE 50 MG: 20 INJECTION INTRAVENOUS at 07:11

## 2024-11-22 RX ADMIN — MIDAZOLAM HYDROCHLORIDE 2 MG: 2 INJECTION, SOLUTION INTRAMUSCULAR; INTRAVENOUS at 07:11

## 2024-11-22 NOTE — BRIEF OP NOTE
[place] - Surgery ([facility])  Brief Operative Note    Surgery Date: 11/22/2024     Surgeons and Role:     * Valentine Roberts MD - Primary    Assisting Surgeon: None    Pre-op Diagnosis:  Trigger finger, right middle finger [M65.331]  Trigger finger, right ring finger [M65.341]    Post-op Diagnosis:  Post-Op Diagnosis Codes:     * Trigger finger, right middle finger [M65.331]     * Trigger finger, right ring finger [M65.341]    Procedure(s) (LRB):  RIGHT MIDDILE AND RING , TRIGGER FINGER, RELEASE (Right)    Anesthesia: Local MAC    Operative Findings: uncomplicated trigger finger releases    Estimated Blood Loss: 0 mL         Specimens:   Specimen (24h ago, onward)      None              Discharge Note    OUTCOME: Patient tolerated treatment/procedure well without complication and is now ready for discharge.    DISPOSITION: Home or Self Care    FINAL DIAGNOSIS:  Trigger finger, right ring finger    FOLLOWUP: In clinic    DISCHARGE INSTRUCTIONS:    Discharge Procedure Orders   Diet general     Call MD for:  temperature >100.4     Call MD for:  persistent nausea and vomiting     Call MD for:  severe uncontrolled pain     Call MD for:  difficulty breathing, headache or visual disturbances     Call MD for:  redness, tenderness, or signs of infection (pain, swelling, redness, odor or green/yellow discharge around incision site)     Call MD for:  hives     Call MD for:  persistent dizziness or light-headedness     Call MD for:  extreme fatigue     Lifting restrictions   Order Comments: No lifting, pushing, or pulling for 2 weeks.     Leave dressing on - Keep it clean, dry, and intact until clinic visit

## 2024-11-22 NOTE — ANESTHESIA PREPROCEDURE EVALUATION
2024  Nadine Frances is a 54 y.o., female.  Pre-operative evaluation for Procedure(s) (LRB):  RIGHT MIDDILE AND RING , TRIGGER FINGER, RELEASE (Right)    Nadine Frances is a 54 y.o. female     Patient Active Problem List   Diagnosis    Bilateral carpal tunnel syndrome    Carpal tunnel syndrome    Trigger finger, right middle finger    Trigger finger, right ring finger    Finger pain, right    Decreased range of motion of finger of right hand       Review of patient's allergies indicates:  No Known Allergies    No current facility-administered medications on file prior to encounter.     Current Outpatient Medications on File Prior to Encounter   Medication Sig Dispense Refill    amLODIPine (NORVASC) 5 MG tablet Take 5 mg by mouth every evening.       OREGANO OIL ORAL Take by mouth.      vitamin D 1000 units Tab Take 1,000 Units by mouth every evening.       acetaminophen (TYLENOL) 325 MG tablet Take 325 mg by mouth every 6 (six) hours as needed for Pain.      montelukast sodium (SINGULAIR ORAL) Take 10 mg by mouth Daily.         Past Surgical History:   Procedure Laterality Date    AUGMENTATION OF BREAST Bilateral     BREAST SURGERY Bilateral 2020    augmentationLUMPECTOMY    CARPAL TUNNEL RELEASE Left 02/10/2021    Procedure: RELEASE, CARPAL TUNNEL - LEFT;  Surgeon: Valentine Roberts MD;  Location: [facility] OR;  Service: Orthopedics;  Laterality: Left;     SECTION      FOOT SURGERY Right     HAND SURGERY      INJECTION OF STEROID Right 02/10/2021    Procedure: INJECTION, STEROID - RIGHT CARPAL TUNNEL INJECTION;  Surgeon: Valentine Roberts MD;  Location: [facility] OR;  Service: Orthopedics;  Laterality: Right;    TRIGGER FINGER RELEASE Left 02/10/2021    Procedure: RELEASE, TRIGGER FINGER - LEFT THUMB;  Surgeon: Valentine Roberts MD;  Location: [facility] OR;  Service:  Orthopedics;  Laterality: Left;    TRIGGER FINGER RELEASE Left 6/28/2024    Procedure: LEFT RING AND MIDDLE RELEASE, TRIGGER FINGER;  Surgeon: Valentine Roberts MD;  Location: [facility];  Service: Orthopedics;  Laterality: Left;       Social History     Socioeconomic History    Marital status: Single   Tobacco Use    Smoking status: Never    Smokeless tobacco: Never   Substance and Sexual Activity    Alcohol use: Yes     Comment: socially           Pre-op Assessment    I have reviewed the Patient Summary Reports.    I have reviewed the NPO Status.   I have reviewed the Medications.     Review of Systems  Anesthesia Hx:   History of prior surgery of interest to airway management or planning:           Personal Hx of Anesthesia complications, Post-Operative Nausea/Vomiting, in the past, but not with recent anesthetics / prophylaxis                    Cardiovascular:     Hypertension               Hx of LBBB                           Pulmonary:  Pulmonary Normal                       Hepatic/GI:  Hepatic/GI Normal                    Neurological:    Neuromuscular Disease,                                   Endocrine:  Endocrine Normal                Physical Exam  General: Well nourished, Cooperative, Alert and Oriented    Airway:  Mallampati: / II  Mouth Opening: Normal  TM Distance: Normal  Tongue: Normal    Dental:  Intact        Anesthesia Plan  Type of Anesthesia, risks & benefits discussed:    Anesthesia Type: Gen Natural Airway  Intra-op Monitoring Plan: Standard ASA Monitors  Post Op Pain Control Plan: multimodal analgesia  Induction:  IV  Airway Plan: Direct, Post-Induction  Informed Consent: Informed consent signed with the Patient and all parties understand the risks and agree with anesthesia plan.  All questions answered.   ASA Score: 2    Ready For Surgery From Anesthesia Perspective.     .

## 2024-11-22 NOTE — PLAN OF CARE
Pre op checklist complete. Pt  resting in bed with call light in reach. All questions answered. Pt belongings at bedside and plan to place in locker. Pt sister brought to bedside.

## 2024-11-22 NOTE — ANESTHESIA POSTPROCEDURE EVALUATION
Anesthesia Post Evaluation    Patient: Nadine Frances    Procedure(s) Performed: Procedure(s) (LRB):  RIGHT MIDDILE AND RING , TRIGGER FINGER, RELEASE (Right)    Final Anesthesia Type: general      Patient location during evaluation: PACU  Patient participation: Yes- Able to Participate  Level of consciousness: awake and alert  Post-procedure vital signs: reviewed and stable  Pain management: adequate  Airway patency: patent    PONV status at discharge: No PONV  Anesthetic complications: no      Cardiovascular status: stable  Respiratory status: unassisted and spontaneous ventilation  Hydration status: euvolemic  Follow-up not needed.              Vitals Value Taken Time   /77 11/22/24 0847   Temp 36.4 °C (97.5 °F) 11/22/24 0845   Pulse 64 11/22/24 0854   Resp 25 11/22/24 0853   SpO2 100 % 11/22/24 0854   Vitals shown include unfiled device data.      Event Time   Out of Recovery 08:45:00         Pain/Jana Score: Pain Rating Prior to Med Admin: 3 (11/22/2024  7:17 AM)  Jana Score: 10 (11/22/2024  8:47 AM)

## 2024-11-22 NOTE — OP NOTE
[facility] ([facility])  Surgery Department  Operative Note    SUMMARY     Date of Procedure: 11/22/2024     Procedure: Procedure(s) (LRB):  RIGHT MIDDILE AND RING , TRIGGER FINGER, RELEASE (Right)     Surgeons and Role:     * Valentine Roberts MD - Primary    Assisting Surgeon: None    Pre-Operative Diagnosis: Trigger finger, right middle finger [M65.331]  Trigger finger, right ring finger [M65.341]    Post-Operative Diagnosis: Post-Op Diagnosis Codes:     * Trigger finger, right middle finger [M65.331]     * Trigger finger, right ring finger [M65.341]    Anesthesia: Local MAC    Technical Procedures Used: surgery    Description of the Findings of the Procedure: SPECIMENS: None.   COMPLICATIONS: None.   INDICATIONS FOR PROCEDURE: Nadine Frances is a 54 y.o.year-old who has failed   conservative treatment for  right long and ring finger trigger finger; therefore,   operative intervention was deemed necessary. Risks and benefits were explained   to the patient in clinic. Consents were performed in clinic. Pt originally signed for a right carpal tunnel release as well. She called before the procedure to no longer proceed with the carpal tunnel release.  PROCEDURE IN DETAIL: After the correct site was marked with the patient's   participation in the Holding Area, the patient was brought to the Operating   Room, placed in supine position, underwent MAC anesthesia. A well-padded   nonsterile tourniquet was placed on the right forearm. Time-out was called for   correct site, procedure and patient to be indicated. Under sterile conditions,   an injection of lidocaine 1% without epinephrine was injected at the A1 pulley   Space for the long and ring fingers. The arm was prepped and draped in normal sterile fashion. Incision was   marked out in a longitudinal fashion along the pulley. The arm was   exsanguinated using Esmarch. Tourniquet was insufflated 250 mmHg and that is   where it remained for 15  minutes. The incision was made. Careful dissection   down was maintained to the A1 pulley first for the long finger then the ring finger. The neurovascular structures were   retracted out of the way. The A1 pulley was identified. It was sharply   Incised first for the long finger then the ring finger. It was completely incised proximally and distally. Portion of    pulley was also incised. The tendon was then retracted out of the tendon sheath   using a right angle. The finger was placed through range of motion, showed it   did not trigger. The area was irrigated with copious amounts of normal saline.   Nylon closed the skin. Sterile dressing was applied. Tourniquet was deflated.   Brisk cap refill ensued. The patient was transferred the Recovery Room in   stable condition.   POSTOPERATIVE PLAN FOR THIS PATIENT: The patient is to keep the dressing clean, dry and   intact. We will take the sutures out at two weeks.   Of note, pt had fraying in the flexor tendon of the middle finger.   Significant Surgical Tasks Conducted by the Assistant(s), if Applicable: none    Complications: No    Estimated Blood Loss (EBL): * No values recorded between 11/22/2024  7:50 AM and 11/22/2024  8:09 AM *           Implants: * No implants in log *    Specimens:   Specimen (24h ago, onward)      None                    Condition: Good    Disposition: PACU - hemodynamically stable.    Attestation: I performed the procedure.    Discharge Note    SUMMARY     Admit Date: 11/22/2024    Discharge Date and Time: No discharge date for patient encounter.    Hospital Course (synopsis of major diagnoses, care, treatment, and services provided during the course of the hospital stay): surgery     Final Diagnosis: Post-Op Diagnosis Codes:     * Trigger finger, right middle finger [M65.331]     * Trigger finger, right ring finger [M65.341]    Disposition: Home or Self Care    Follow Up/Patient Instructions:     Medications:  Reconciled Home  Medications:      Medication List        START taking these medications      ondansetron 4 MG Tbdl  Commonly known as: ZOFRAN-ODT  Dissolve 1 tablet (4 mg total) by mouth every 6 (six) hours as needed (For Nausea).     traMADoL 50 mg tablet  Commonly known as: ULTRAM  Take 1 tablet (50 mg total) by mouth every 6 (six) hours as needed for Pain.            CONTINUE taking these medications      acetaminophen 325 MG tablet  Commonly known as: TYLENOL  Take 325 mg by mouth every 6 (six) hours as needed for Pain.     amLODIPine 5 MG tablet  Commonly known as: NORVASC  Take 5 mg by mouth every evening.     OREGANO OIL ORAL  Take by mouth.     SINGULAIR ORAL  Take 10 mg by mouth Daily.     vitamin D 1000 units Tab  Commonly known as: VITAMIN D3  Take 1,000 Units by mouth every evening.            Discharge Procedure Orders   Diet general     Call MD for:  temperature >100.4     Call MD for:  persistent nausea and vomiting     Call MD for:  severe uncontrolled pain     Call MD for:  difficulty breathing, headache or visual disturbances     Call MD for:  redness, tenderness, or signs of infection (pain, swelling, redness, odor or green/yellow discharge around incision site)     Call MD for:  hives     Call MD for:  persistent dizziness or light-headedness     Call MD for:  extreme fatigue     Lifting restrictions   Order Comments: No lifting, pushing, or pulling for 2 weeks.     Leave dressing on - Keep it clean, dry, and intact until clinic visit

## 2024-11-22 NOTE — TRANSFER OF CARE
"Anesthesia Transfer of Care Note    Patient: Nadine Frances    Procedure(s) Performed: Procedure(s) (LRB):  RIGHT MIDDILE AND RING , TRIGGER FINGER, RELEASE (Right)    Patient location: PACU    Anesthesia Type: general    Transport from OR: Transported from OR on 100% O2 by closed face mask with adequate spontaneous ventilation    Post pain: adequate analgesia    Post assessment: no apparent anesthetic complications and tolerated procedure well    Post vital signs: stable    Level of consciousness: sedated and responds to stimulation    Nausea/Vomiting: no nausea/vomiting    Complications: none    Transfer of care protocol was followed    Last vitals: Visit Vitals  /68 (BP Location: Left arm, Patient Position: Lying)   Pulse 65   Temp 36.5 °C (97.7 °F) (Oral)   Resp 16   Ht 5' 5" (1.651 m)   Wt 63 kg (139 lb)   LMP 01/01/2020 (Approximate)   SpO2 98%   Breastfeeding No   BMI 23.13 kg/m²     "

## 2024-11-22 NOTE — PLAN OF CARE
Care plan reviewed w/ pt and family at bedside. AVSS on RA. R hand dressing CDI. No c/o pain. All Rx delivered to bedside. Pt states she is ready for discharge.

## 2024-12-06 ENCOUNTER — OFFICE VISIT (OUTPATIENT)
Dept: ORTHOPEDICS | Facility: CLINIC | Age: 54
End: 2024-12-06
Payer: COMMERCIAL

## 2024-12-06 DIAGNOSIS — M65.341 TRIGGER FINGER, RIGHT RING FINGER: ICD-10-CM

## 2024-12-06 DIAGNOSIS — Z98.890 POST-OPERATIVE STATE: Primary | ICD-10-CM

## 2024-12-06 DIAGNOSIS — M65.331 TRIGGER FINGER, RIGHT MIDDLE FINGER: ICD-10-CM

## 2024-12-06 PROCEDURE — 99999 PR PBB SHADOW E&M-EST. PATIENT-LVL III: CPT | Mod: PBBFAC,,, | Performed by: SPECIALIST/TECHNOLOGIST

## (undated) DEVICE — SYR 10CC LUER LOCK

## (undated) DEVICE — SOL IRR SOD CHL .9% POUR

## (undated) DEVICE — BANDAGE MATRIX HK LOOP 2IN 5YD

## (undated) DEVICE — COVER CAMERA OPERATING ROOM

## (undated) DEVICE — CORD BIPOLAR 12 FOOT

## (undated) DEVICE — SYR B-D DISP CONTROL 10CC100/C

## (undated) DEVICE — DRESSING N ADH OIL EMUL 3X3

## (undated) DEVICE — SOL POVIDONE SCRUB IODINE 4 OZ

## (undated) DEVICE — BANDAGE CONFORM 3IN STRL

## (undated) DEVICE — TOURNIQUET SB QC DP 18X4IN

## (undated) DEVICE — GLOVE BIOGEL PI MICRO INDIC 7

## (undated) DEVICE — DRAPE STERI-DRAPE 1000 17X11IN

## (undated) DEVICE — Device

## (undated) DEVICE — NDL HYPO STD REG BVL 18GX1.5IN

## (undated) DEVICE — GOWN ECLIPSE REINF LVL4 TWL XL

## (undated) DEVICE — DRAPE SURG W/TWL 17 5/8X23

## (undated) DEVICE — TOURNIQUET SB QC SP 18X4IN

## (undated) DEVICE — CORD BIPOLAR ELECTROSURGICAL

## (undated) DEVICE — NDL MONOJECT BLACK 22GA 1.5IN

## (undated) DEVICE — BANDAGE ELASTIC 2X5 VELCRO ST

## (undated) DEVICE — SEE MEDLINE ITEM 152514

## (undated) DEVICE — SPONGE COTTON TRAY 4X4IN

## (undated) DEVICE — GAUZE SPONGE 4X4 12PLY

## (undated) DEVICE — DRESSING GAUZE 6PLY 4X4

## (undated) DEVICE — SUT 4/0 18IN ETHILON BL P3

## (undated) DEVICE — GLOVE BIOGEL SKINSENSE PI 7.0

## (undated) DEVICE — SOL PVP-I SCRUB 7.5% 4OZ

## (undated) DEVICE — PACK UPPER EXTREMITY BAPTIST

## (undated) DEVICE — APPLICATOR CHLORAPREP ORN 26ML

## (undated) DEVICE — UNDERGLOVES BIOGEL PI SZ 7 LF

## (undated) DEVICE — BANDAGE GAUZE 6PLY FLUFF 2X3

## (undated) DEVICE — BANDAGE BULKEE II 2.25INX3YD

## (undated) DEVICE — SOL 9P NACL IRR PIC IL

## (undated) DEVICE — NDL 18GA X1 1/2 REG BEVEL

## (undated) DEVICE — GLOVE BIOGEL ECLIPSE SZ 7

## (undated) DEVICE — DRESSING ADAPTIC TOUCH 3X4

## (undated) DEVICE — BLADE SURG #15 CARBON STEEL

## (undated) DEVICE — FORCEP STRAIGHT DISP

## (undated) DEVICE — NDL 22GA X1 1/2 REG BEVEL

## (undated) DEVICE — PADDING CAST 4IN SPECIALIST

## (undated) DEVICE — TOWEL OR XRAY BLUE 17X26IN

## (undated) DEVICE — PAD UNDERPAD 30X30

## (undated) DEVICE — GAUZE DERMACEA LOW PLY 3X4YRDS

## (undated) DEVICE — FORCEP BIPOLAR ADSON 1MM TIP

## (undated) DEVICE — SOL BETADINE 5%

## (undated) DEVICE — NDL SAFETY 22G X 1.5 ECLIPSE

## (undated) DEVICE — CORD BIPOLAR 12 FT STERILE

## (undated) DEVICE — SEE MEDLINE ITEM 146268

## (undated) DEVICE — TOWEL OR DISP STRL BLUE 4/PK